# Patient Record
Sex: FEMALE | Race: WHITE | NOT HISPANIC OR LATINO | URBAN - METROPOLITAN AREA
[De-identification: names, ages, dates, MRNs, and addresses within clinical notes are randomized per-mention and may not be internally consistent; named-entity substitution may affect disease eponyms.]

---

## 2017-07-27 ENCOUNTER — EMERGENCY (EMERGENCY)
Facility: HOSPITAL | Age: 67
LOS: 1 days | Discharge: HOME | End: 2017-07-27
Attending: EMERGENCY MEDICINE | Admitting: EMERGENCY MEDICINE
Payer: MEDICARE

## 2017-07-27 VITALS
DIASTOLIC BLOOD PRESSURE: 91 MMHG | HEART RATE: 70 BPM | SYSTOLIC BLOOD PRESSURE: 165 MMHG | RESPIRATION RATE: 18 BRPM | OXYGEN SATURATION: 98 % | TEMPERATURE: 99 F

## 2017-07-27 VITALS
OXYGEN SATURATION: 98 % | HEART RATE: 98 BPM | WEIGHT: 149.91 LBS | HEIGHT: 63 IN | SYSTOLIC BLOOD PRESSURE: 163 MMHG | DIASTOLIC BLOOD PRESSURE: 97 MMHG | TEMPERATURE: 98 F | RESPIRATION RATE: 18 BRPM
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
## 2017-07-27 DIAGNOSIS — R51 HEADACHE: ICD-10-CM
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
## 2017-07-27 DIAGNOSIS — S09.90XA UNSPECIFIED INJURY OF HEAD, INITIAL ENCOUNTER: ICD-10-CM

## 2017-07-27 DIAGNOSIS — S05.11XA CONTUSION OF EYEBALL AND ORBITAL TISSUES, RIGHT EYE, INITIAL ENCOUNTER: ICD-10-CM

## 2017-07-27 PROCEDURE — 70450 CT HEAD/BRAIN W/O DYE: CPT | Mod: 26

## 2017-07-27 PROCEDURE — 99284 EMERGENCY DEPT VISIT MOD MDM: CPT

## 2017-07-27 PROCEDURE — 70450 CT HEAD/BRAIN W/O DYE: CPT

## 2017-07-27 PROCEDURE — 99284 EMERGENCY DEPT VISIT MOD MDM: CPT | Mod: 25

## 2017-07-27 NOTE — ED PROVIDER NOTE - MEDICAL DECISION MAKING DETAILS
Pt presents wih [facility] after fall a few days ago while drinking alcohol. CT head with NAD. Pt reassured. Given alcohol rehab info per her request and to f/up outpt.

## 2017-07-27 NOTE — ED PROVIDER NOTE - OBJECTIVE STATEMENT
65 yo female with hx of depression, "problem with alcohol" presents with fall 4 days ago after "I drank too much". Pt states she drank a lot of alcohol while out with family and fell and hit her right head against a counter and per family went unresponsive. Pt states that she was given "CPR" by her family and came back to consciousness and then was taken home and then went to sleep. Did not seek any medical help until today. Pt is c/o mild right sided 65 yo female with hx of depression, "problem with alcohol" presents with fall 4 days ago after "I drank too much". Pt states she drank a lot of alcohol while out with family and fell and hit her right head against a counter and per family went unresponsive. Pt states that she was given "CPR" by her family and came back to consciousness and then was taken home and then went to sleep. Did not seek any medical help until today. Pt comes in today because she noted some bruising around her right eye which developed a day after the fall which has worsened over the past 24 hours. Denies blurry or double vision or eye pain, focal neuro deficits.  Pt is c/o mild right sided headache which decreased with ibuprofen. No N/V/CP/SOB. No other complaints. No other injuries. Is not on prescribed anticoagulation or aspirin. 65 yo female with hx of depression, "problem with alcohol" presents with fall 4 days ago after "I drank too much". Pt states she drank a lot of alcohol while out with family and fell and hit her right head against a counter and per family went unresponsive. Pt states that she was given "CPR" by her family and came back to consciousness and then was taken home and then went to sleep. Did not seek any medical help until today. Pt comes in today because she noted some bruising around her right eye which developed a day after the fall which has worsened over the past 24 hours. Denies blurry or double vision or eye pain, focal neuro deficits.  Pt is c/o mild right sided headache which decreased with ibuprofen. No N/V/CP/SOB. No other complaints. No other injuries. Is not on prescribed anticoagulation or aspirin. Pt declines pain meds stating that she is not on in that much pain. 65 yo female with hx of depression, "problem with alcohol" presents with fall 4 days ago after "I drank too much". Pt states she drank a lot of alcohol while out with family and fell and hit her right head against a counter, and per family went unresponsive. Pt states that she was given "CPR" by her family and came back to consciousness and then was taken home and went to sleep. Did not seek any medical help until today. Pt comes in today because she noted some bruising around her right eye which developed a day after the fall which has worsened over the past 24 hours. Denies blurry or double vision or eye pain, focal neuro deficits.  Pt is c/o mild right sided headache which decreased with ibuprofen. No N/V/CP/SOB. No other complaints. No other injuries. Is not on prescribed anticoagulation or aspirin. Pt declines pain meds stating that she is not on in that much pain.

## 2017-07-27 NOTE — ED ADULT NURSE NOTE - OBJECTIVE STATEMENT
Pt presents to ER c/o one syncopal episode on Monday after drinking "a lot." Pt has bruising, swelling, and tenderness to right temporal region/face. Pt stated "My niece did CPR on me bc she said I wasn't breathing. I flew home the next day and woke up today with bruising so I wanted to get checked out." Pt denies CP, SOB, dizziness. Pt has normal sensation UE/LE/face, facial symmetry, hand grasp strong equal bilat, PERRL. Pt reports taking advil everyday for aches and pains.

## 2017-07-27 NOTE — ED ADULT TRIAGE NOTE - CHIEF COMPLAINT QUOTE
Patient c/o headache ,s/p fall last monday after had few alcohol drinks  . Today she noticed black eye on her rt eye , denies any eye pain nor blurry vision . But pt. stated that she passed out on the counter top and her niece had to do CPR .  Denies any nausea nor vomiting .

## 2019-02-10 ENCOUNTER — INPATIENT (INPATIENT)
Facility: HOSPITAL | Age: 69
LOS: 5 days | Discharge: NURSING, REHAB OR LONG-TERM CARE | DRG: 492 | End: 2019-02-16
Attending: ORTHOPAEDIC SURGERY | Admitting: ORTHOPAEDIC SURGERY
Payer: MEDICARE

## 2019-02-10 VITALS
RESPIRATION RATE: 16 BRPM | SYSTOLIC BLOOD PRESSURE: 119 MMHG | HEART RATE: 80 BPM | DIASTOLIC BLOOD PRESSURE: 71 MMHG | OXYGEN SATURATION: 100 % | TEMPERATURE: 99 F

## 2019-02-10 DIAGNOSIS — K92.2 GASTROINTESTINAL HEMORRHAGE, UNSPECIFIED: ICD-10-CM

## 2019-02-10 DIAGNOSIS — E78.5 HYPERLIPIDEMIA, UNSPECIFIED: ICD-10-CM

## 2019-02-10 DIAGNOSIS — Z96.649 PRESENCE OF UNSPECIFIED ARTIFICIAL HIP JOINT: Chronic | ICD-10-CM

## 2019-02-10 DIAGNOSIS — D64.9 ANEMIA, UNSPECIFIED: ICD-10-CM

## 2019-02-10 DIAGNOSIS — R63.8 OTHER SYMPTOMS AND SIGNS CONCERNING FOOD AND FLUID INTAKE: ICD-10-CM

## 2019-02-10 DIAGNOSIS — S82.891A OTHER FRACTURE OF RIGHT LOWER LEG, INITIAL ENCOUNTER FOR CLOSED FRACTURE: ICD-10-CM

## 2019-02-10 DIAGNOSIS — Z91.89 OTHER SPECIFIED PERSONAL RISK FACTORS, NOT ELSEWHERE CLASSIFIED: ICD-10-CM

## 2019-02-10 DIAGNOSIS — R55 SYNCOPE AND COLLAPSE: ICD-10-CM

## 2019-02-10 LAB
ALBUMIN SERPL ELPH-MCNC: 3.5 G/DL — SIGNIFICANT CHANGE UP (ref 3.3–5)
ALP SERPL-CCNC: 37 U/L — LOW (ref 40–120)
ALT FLD-CCNC: 14 U/L — SIGNIFICANT CHANGE UP (ref 10–45)
ANION GAP SERPL CALC-SCNC: 10 MMOL/L — SIGNIFICANT CHANGE UP (ref 5–17)
AST SERPL-CCNC: 12 U/L — SIGNIFICANT CHANGE UP (ref 10–40)
BASOPHILS # BLD AUTO: 0.03 K/UL — SIGNIFICANT CHANGE UP (ref 0–0.2)
BASOPHILS NFR BLD AUTO: 0.4 % — SIGNIFICANT CHANGE UP (ref 0–2)
BILIRUB SERPL-MCNC: 0.4 MG/DL — SIGNIFICANT CHANGE UP (ref 0.2–1.2)
BLD GP AB SCN SERPL QL: NEGATIVE — SIGNIFICANT CHANGE UP
BUN SERPL-MCNC: 47 MG/DL — HIGH (ref 7–23)
CALCIUM SERPL-MCNC: 8.6 MG/DL — SIGNIFICANT CHANGE UP (ref 8.4–10.5)
CHLORIDE SERPL-SCNC: 109 MMOL/L — HIGH (ref 96–108)
CO2 SERPL-SCNC: 20 MMOL/L — LOW (ref 22–31)
CREAT SERPL-MCNC: 0.67 MG/DL — SIGNIFICANT CHANGE UP (ref 0.5–1.3)
EOSINOPHIL # BLD AUTO: 0.02 K/UL — SIGNIFICANT CHANGE UP (ref 0–0.5)
EOSINOPHIL NFR BLD AUTO: 0.2 % — SIGNIFICANT CHANGE UP (ref 0–6)
GLUCOSE SERPL-MCNC: 147 MG/DL — HIGH (ref 70–99)
HCT VFR BLD CALC: 21.3 % — LOW (ref 34.5–45)
HCT VFR BLD CALC: 25.9 % — LOW (ref 34.5–45)
HGB BLD-MCNC: 7 G/DL — CRITICAL LOW (ref 11.5–15.5)
HGB BLD-MCNC: 8.2 G/DL — LOW (ref 11.5–15.5)
IMM GRANULOCYTES NFR BLD AUTO: 0.2 % — SIGNIFICANT CHANGE UP (ref 0–1.5)
LIDOCAIN IGE QN: 30 U/L — SIGNIFICANT CHANGE UP (ref 7–60)
LYMPHOCYTES # BLD AUTO: 1.48 K/UL — SIGNIFICANT CHANGE UP (ref 1–3.3)
LYMPHOCYTES # BLD AUTO: 18.3 % — SIGNIFICANT CHANGE UP (ref 13–44)
MAGNESIUM SERPL-MCNC: 1.5 MG/DL — LOW (ref 1.6–2.6)
MCHC RBC-ENTMCNC: 31.7 GM/DL — LOW (ref 32–36)
MCHC RBC-ENTMCNC: 32.4 PG — SIGNIFICANT CHANGE UP (ref 27–34)
MCHC RBC-ENTMCNC: 32.9 GM/DL — SIGNIFICANT CHANGE UP (ref 32–36)
MCHC RBC-ENTMCNC: 33.5 PG — SIGNIFICANT CHANGE UP (ref 27–34)
MCV RBC AUTO: 101.9 FL — HIGH (ref 80–100)
MCV RBC AUTO: 102.4 FL — HIGH (ref 80–100)
MONOCYTES # BLD AUTO: 0.64 K/UL — SIGNIFICANT CHANGE UP (ref 0–0.9)
MONOCYTES NFR BLD AUTO: 7.9 % — SIGNIFICANT CHANGE UP (ref 2–14)
NEUTROPHILS # BLD AUTO: 5.88 K/UL — SIGNIFICANT CHANGE UP (ref 1.8–7.4)
NEUTROPHILS NFR BLD AUTO: 73 % — SIGNIFICANT CHANGE UP (ref 43–77)
NRBC # BLD: 0 /100 WBCS — SIGNIFICANT CHANGE UP (ref 0–0)
OB PNL STL: POSITIVE
PLATELET # BLD AUTO: 117 K/UL — LOW (ref 150–400)
PLATELET # BLD AUTO: 133 K/UL — LOW (ref 150–400)
POTASSIUM SERPL-MCNC: 4.1 MMOL/L — SIGNIFICANT CHANGE UP (ref 3.5–5.3)
POTASSIUM SERPL-SCNC: 4.1 MMOL/L — SIGNIFICANT CHANGE UP (ref 3.5–5.3)
PROT SERPL-MCNC: 5.2 G/DL — LOW (ref 6–8.3)
RBC # BLD: 2.09 M/UL — LOW (ref 3.8–5.2)
RBC # BLD: 2.53 M/UL — LOW (ref 3.8–5.2)
RBC # FLD: 14.5 % — SIGNIFICANT CHANGE UP (ref 10.3–14.5)
RBC # FLD: 14.8 % — HIGH (ref 10.3–14.5)
RH IG SCN BLD-IMP: NEGATIVE — SIGNIFICANT CHANGE UP
RH IG SCN BLD-IMP: NEGATIVE — SIGNIFICANT CHANGE UP
SODIUM SERPL-SCNC: 139 MMOL/L — SIGNIFICANT CHANGE UP (ref 135–145)
TROPONIN T SERPL-MCNC: <0.01 NG/ML — SIGNIFICANT CHANGE UP (ref 0–0.01)
WBC # BLD: 8.07 K/UL — SIGNIFICANT CHANGE UP (ref 3.8–10.5)
WBC # BLD: 8.96 K/UL — SIGNIFICANT CHANGE UP (ref 3.8–10.5)
WBC # FLD AUTO: 8.07 K/UL — SIGNIFICANT CHANGE UP (ref 3.8–10.5)
WBC # FLD AUTO: 8.96 K/UL — SIGNIFICANT CHANGE UP (ref 3.8–10.5)

## 2019-02-10 PROCEDURE — 99223 1ST HOSP IP/OBS HIGH 75: CPT | Mod: GC

## 2019-02-10 PROCEDURE — 73560 X-RAY EXAM OF KNEE 1 OR 2: CPT | Mod: 26,RT

## 2019-02-10 PROCEDURE — 99285 EMERGENCY DEPT VISIT HI MDM: CPT | Mod: 25

## 2019-02-10 PROCEDURE — 71045 X-RAY EXAM CHEST 1 VIEW: CPT | Mod: 26

## 2019-02-10 PROCEDURE — 73610 X-RAY EXAM OF ANKLE: CPT | Mod: 26,77,RT

## 2019-02-10 PROCEDURE — 73700 CT LOWER EXTREMITY W/O DYE: CPT | Mod: 26,RT

## 2019-02-10 PROCEDURE — 73610 X-RAY EXAM OF ANKLE: CPT | Mod: 26,RT

## 2019-02-10 PROCEDURE — 93010 ELECTROCARDIOGRAM REPORT: CPT

## 2019-02-10 PROCEDURE — 73600 X-RAY EXAM OF ANKLE: CPT | Mod: 26,59,RT

## 2019-02-10 RX ORDER — ATORVASTATIN CALCIUM 80 MG/1
0 TABLET, FILM COATED ORAL
Qty: 0 | Refills: 0 | COMMUNITY

## 2019-02-10 RX ORDER — SERTRALINE 25 MG/1
1 TABLET, FILM COATED ORAL
Qty: 0 | Refills: 0 | COMMUNITY

## 2019-02-10 RX ORDER — SODIUM CHLORIDE 9 MG/ML
500 INJECTION INTRAMUSCULAR; INTRAVENOUS; SUBCUTANEOUS ONCE
Qty: 0 | Refills: 0 | Status: COMPLETED | OUTPATIENT
Start: 2019-02-10 | End: 2019-02-10

## 2019-02-10 RX ORDER — TRAMADOL HYDROCHLORIDE 50 MG/1
50 TABLET ORAL EVERY 4 HOURS
Qty: 0 | Refills: 0 | Status: DISCONTINUED | OUTPATIENT
Start: 2019-02-10 | End: 2019-02-13

## 2019-02-10 RX ORDER — SODIUM CHLORIDE 9 MG/ML
1000 INJECTION INTRAMUSCULAR; INTRAVENOUS; SUBCUTANEOUS
Qty: 0 | Refills: 0 | Status: DISCONTINUED | OUTPATIENT
Start: 2019-02-10 | End: 2019-02-10

## 2019-02-10 RX ORDER — ATORVASTATIN CALCIUM 80 MG/1
1 TABLET, FILM COATED ORAL
Qty: 0 | Refills: 0 | COMMUNITY

## 2019-02-10 RX ORDER — HYDROMORPHONE HYDROCHLORIDE 2 MG/ML
0.5 INJECTION INTRAMUSCULAR; INTRAVENOUS; SUBCUTANEOUS ONCE
Qty: 0 | Refills: 0 | Status: DISCONTINUED | OUTPATIENT
Start: 2019-02-10 | End: 2019-02-10

## 2019-02-10 RX ORDER — PANTOPRAZOLE SODIUM 20 MG/1
8 TABLET, DELAYED RELEASE ORAL
Qty: 80 | Refills: 0 | Status: DISCONTINUED | OUTPATIENT
Start: 2019-02-10 | End: 2019-02-10

## 2019-02-10 RX ORDER — SODIUM CHLORIDE 9 MG/ML
1000 INJECTION INTRAMUSCULAR; INTRAVENOUS; SUBCUTANEOUS ONCE
Qty: 0 | Refills: 0 | Status: COMPLETED | OUTPATIENT
Start: 2019-02-10 | End: 2019-02-10

## 2019-02-10 RX ORDER — PANTOPRAZOLE SODIUM 20 MG/1
80 TABLET, DELAYED RELEASE ORAL ONCE
Qty: 0 | Refills: 0 | Status: COMPLETED | OUTPATIENT
Start: 2019-02-10 | End: 2019-02-10

## 2019-02-10 RX ORDER — MAGNESIUM SULFATE 500 MG/ML
2 VIAL (ML) INJECTION ONCE
Qty: 0 | Refills: 0 | Status: COMPLETED | OUTPATIENT
Start: 2019-02-10 | End: 2019-02-10

## 2019-02-10 RX ORDER — INFLUENZA VIRUS VACCINE 15; 15; 15; 15 UG/.5ML; UG/.5ML; UG/.5ML; UG/.5ML
0.5 SUSPENSION INTRAMUSCULAR ONCE
Qty: 0 | Refills: 0 | Status: COMPLETED | OUTPATIENT
Start: 2019-02-10 | End: 2019-02-10

## 2019-02-10 RX ORDER — ACETAMINOPHEN 500 MG
650 TABLET ORAL EVERY 6 HOURS
Qty: 0 | Refills: 0 | Status: DISCONTINUED | OUTPATIENT
Start: 2019-02-10 | End: 2019-02-16

## 2019-02-10 RX ORDER — PANTOPRAZOLE SODIUM 20 MG/1
40 TABLET, DELAYED RELEASE ORAL EVERY 12 HOURS
Qty: 0 | Refills: 0 | Status: DISCONTINUED | OUTPATIENT
Start: 2019-02-10 | End: 2019-02-12

## 2019-02-10 RX ORDER — ATORVASTATIN CALCIUM 80 MG/1
20 TABLET, FILM COATED ORAL AT BEDTIME
Qty: 0 | Refills: 0 | Status: DISCONTINUED | OUTPATIENT
Start: 2019-02-10 | End: 2019-02-16

## 2019-02-10 RX ORDER — SERTRALINE 25 MG/1
0 TABLET, FILM COATED ORAL
Qty: 0 | Refills: 0 | COMMUNITY

## 2019-02-10 RX ADMIN — SODIUM CHLORIDE 1000 MILLILITER(S): 9 INJECTION INTRAMUSCULAR; INTRAVENOUS; SUBCUTANEOUS at 06:40

## 2019-02-10 RX ADMIN — SODIUM CHLORIDE 500 MILLILITER(S): 9 INJECTION INTRAMUSCULAR; INTRAVENOUS; SUBCUTANEOUS at 09:20

## 2019-02-10 RX ADMIN — TRAMADOL HYDROCHLORIDE 50 MILLIGRAM(S): 50 TABLET ORAL at 21:45

## 2019-02-10 RX ADMIN — HYDROMORPHONE HYDROCHLORIDE 0.5 MILLIGRAM(S): 2 INJECTION INTRAMUSCULAR; INTRAVENOUS; SUBCUTANEOUS at 16:13

## 2019-02-10 RX ADMIN — SODIUM CHLORIDE 1000 MILLILITER(S): 9 INJECTION INTRAMUSCULAR; INTRAVENOUS; SUBCUTANEOUS at 07:40

## 2019-02-10 RX ADMIN — HYDROMORPHONE HYDROCHLORIDE 0.5 MILLIGRAM(S): 2 INJECTION INTRAMUSCULAR; INTRAVENOUS; SUBCUTANEOUS at 11:54

## 2019-02-10 RX ADMIN — PANTOPRAZOLE SODIUM 40 MILLIGRAM(S): 20 TABLET, DELAYED RELEASE ORAL at 18:44

## 2019-02-10 RX ADMIN — TRAMADOL HYDROCHLORIDE 50 MILLIGRAM(S): 50 TABLET ORAL at 22:45

## 2019-02-10 RX ADMIN — HYDROMORPHONE HYDROCHLORIDE 0.5 MILLIGRAM(S): 2 INJECTION INTRAMUSCULAR; INTRAVENOUS; SUBCUTANEOUS at 09:46

## 2019-02-10 RX ADMIN — Medication 50 GRAM(S): at 11:28

## 2019-02-10 RX ADMIN — PANTOPRAZOLE SODIUM 10 MG/HR: 20 TABLET, DELAYED RELEASE ORAL at 08:50

## 2019-02-10 RX ADMIN — PANTOPRAZOLE SODIUM 80 MILLIGRAM(S): 20 TABLET, DELAYED RELEASE ORAL at 08:50

## 2019-02-10 RX ADMIN — HYDROMORPHONE HYDROCHLORIDE 0.5 MILLIGRAM(S): 2 INJECTION INTRAMUSCULAR; INTRAVENOUS; SUBCUTANEOUS at 09:22

## 2019-02-10 RX ADMIN — SODIUM CHLORIDE 125 MILLILITER(S): 9 INJECTION INTRAMUSCULAR; INTRAVENOUS; SUBCUTANEOUS at 11:00

## 2019-02-10 RX ADMIN — SODIUM CHLORIDE 1000 MILLILITER(S): 9 INJECTION INTRAMUSCULAR; INTRAVENOUS; SUBCUTANEOUS at 08:50

## 2019-02-10 RX ADMIN — ATORVASTATIN CALCIUM 20 MILLIGRAM(S): 80 TABLET, FILM COATED ORAL at 21:45

## 2019-02-10 NOTE — ED PROVIDER NOTE - OBJECTIVE STATEMENT
67 yo female h/o hld, depression/anxiety, s/p THR c/o syncope.  Pt reports recent dental surgery last week and was started on azithromycin as well as ibuprofen and tylenol afterwards (400 mg ibuprofen q4-6 hour and then 600 mg q 6 hour).  Pt noted onset of freq, black diarrhea and crampy abd pain as well as epigastric pain radiating to bilat uq since Fri, worse yest.  Pt reports fainting after using the bathroom last evening and again early this am.  Before fainting, pt felt v dizzy/lh, diaphoretic and weak w/ mild n.  Pt denies cp, palpitations but notes sob.  Dizziness/lh and sob worse w standing/walking.  No h/o pud, gi bleed.  No other recent abx prior to zpack for her teeth.  Pt c/o R ankle and mild knee pain after her syncope and unable to wt bear 2/2 pain.  No other pain in ext, neck/back pain.

## 2019-02-10 NOTE — CONSULT NOTE ADULT - ATTENDING COMMENTS
68 year old female with syncope and acute blood loss anemia from possible NSAIDS induced GI bleed. Vitals are stable. 1 unit PRBC given along with PPI. Plan for elective endoscopy as per GI. If H/H is stable then start patient on clear diet and advance as tolerated.

## 2019-02-10 NOTE — H&P ADULT - NSHPLABSRESULTS_GEN_ALL_CORE
LABS:              7.0    8.07  )-----------( 133      ( 10 Feb 2019 06:54 )             21.3     02-10  139  |  109<H>  |  47<H>  ----------------------------<  147<H>  4.1   |  20<L>  |  0.67    Ca    8.6      10 Feb 2019 06:54  Mg     1.5     02-10    TPro  5.2<L>  /  Alb  3.5  /  TBili  0.4  /  DBili  x   /  AST  12  /  ALT  14  /  AlkPhos  37<L>  02-10    PT/INR - ( 10 Feb 2019 06:54 )   PT: 10.2 sec;   INR: 0.91     PTT - ( 10 Feb 2019 06:54 )  PTT:22.7 sec    CARDIAC MARKERS ( 10 Feb 2019 06:54 )  x     / <0.01 ng/mL / 51 U/L / x     / 1.3 ng/mL    RADIOLOGY, EKG & ADDITIONAL TESTS: Reviewed.

## 2019-02-10 NOTE — PROGRESS NOTE ADULT - SUBJECTIVE AND OBJECTIVE BOX
68 year old woman with syncope, right foot pain & black stools.  Underwent root canal 02\06 after which was given azithromycin & took a few Percocet.   Noted pus & pain at sight, prompting a visit to another oral surgeon.  Began to take ibuprofen, 400 mg every 4 hours.  Had had diarrhea, but by 02\09 the stools became black.  Approximately 12 PM while out at elevator felt lightheaded & returned to her apartment.  Took it easy & wnt to bed, but 12 AM 02\10, went to bathroom & blacked out.  Unable to get up, she called for her  who wanted to take her to hospital, but she just went back to bed.  4 AM went to bathroom again, blacked out & they called me.  I directed them to ED.    Former smoker with coronary calcium prompting treatment with atorvastatin. Also takes sertraline for anxiety.    Had left total hip replacement at [facility] February 2016 for osteoarthritis    T< 100 Pulse 78 supine B\P 110\60 supine  Lungs clear  Card Nl S1&S2  Ext no edema      WBC 8070 Hgb 7.0 Plt 133K  BUN 44 Cr 0.67  PT/PTT Nl  Troponin negative    Upper GI bleed with syncope & right ankle fracture on Xray  Dr Anup Carvalho called to see her for GI & orthopedic service consulted  Will likely require transfusions & might require open reduction repair of ankle fracture  Will follow    Dr Diogo Quijano  [phone]

## 2019-02-10 NOTE — ED PROVIDER NOTE - CONSTITUTIONAL, MLM
normal... pale, well nourished, awake, alert, oriented to person, place, time/situation and in no apparent distress.

## 2019-02-10 NOTE — CONSULT NOTE ADULT - SUBJECTIVE AND OBJECTIVE BOX
INCOMPLETE:     [facility] SERVICE CONSULTATION NOTE    CC:    HPI:    ROS:  Otherwise negative, except as specified in HPI.    PMH:    PSH:    FH:    SH:    ALLERGIES:    MEDICATIONS:    VITAL SIGNS:  ICU Vital Signs Last 24 Hrs  T(C): 37.1 (10 Feb 2019 06:35), Max: 37.1 (10 Feb 2019 06:35)  T(F): 98.8 (10 Feb 2019 06:35), Max: 98.8 (10 Feb 2019 06:35)  HR: 80 (10 Feb 2019 06:35) (80 - 80)  BP: 119/71 (10 Feb 2019 06:35) (119/71 - 119/71)  BP(mean): --  ABP: --  ABP(mean): --  RR: 16 (10 Feb 2019 06:35) (16 - 16)  SpO2: 100% (10 Feb 2019 06:35) (100% - 100%)    CAPILLARY BLOOD GLUCOSE      POCT Blood Glucose.: 144 mg/dL (10 Feb 2019 06:42)      PHYSICAL EXAM:  Constitutional: resting comfortably in bed, NAD  HEENT: NC/AT; PERRL, anicteric sclera; no oropharyngeal erythema or exudates; MMM  Neck: supple, no appreciable JVD  Respiratory: CTA B/L, no W/R/R; respirations appear non-labored, conversive in full sentences  Cardiovascular: +S1/S2, RRR  Gastrointestinal: abdomen soft, NT/ND  Extremities: WWP; no clubbing, cyanosis or edema  Vascular: 2+ radial, femoral, and DP/PT pulses B/L  Dermatologic: skin normal color and turgor; no visible rashes  Neurological:     LABS:                        7.0    8.07  )-----------( 133      ( 10 Feb 2019 06:54 )             21.3     02-10    139  |  109<H>  |  47<H>  ----------------------------<  147<H>  4.1   |  20<L>  |  0.67    Ca    8.6      10 Feb 2019 06:54  Mg     1.5     02-10    TPro  5.2<L>  /  Alb  3.5  /  TBili  0.4  /  DBili  x   /  AST  12  /  ALT  14  /  AlkPhos  37<L>  02-10    PT/INR - ( 10 Feb 2019 06:54 )   PT: 10.2 sec;   INR: 0.91          PTT - ( 10 Feb 2019 06:54 )  PTT:22.7 sec    CARDIAC MARKERS ( 10 Feb 2019 06:54 )  x     / <0.01 ng/mL / 51 U/L / x     / 1.3 ng/mL            EKG: Reviewed.    RADIOLOGY & ADDITIONAL TESTS: Reviewed. [facility] SERVICE CONSULTATION NOTE    CC: acute blood loss anemia    HPI: 68F with PMHx of HLD, depression who presents after syncopal episode early in the morning. Per patient, she had root canal done on 2/6 during which she was found to have dental infection and given course of Azithromycin and instructions to take Advil 200mg x2 tabs Q6hrs along with Tylenol PRN. Patient reports taking Advil ATC and on 2/8, due to increased dental pain, took 3 tabs Q6hrs. Patient endorses developing dizziness and lightheadedness on 2/8 while out with friends. Also developed diarrhea at that time, however unclear whether melanotic. On 2/9, patient had near syncopal episode when trying to go to store, +lightheadedness and dizziness, however no LOC. Patient was able to return to apartment and get into bed. Reports continuing to have multiple loose bowel movements throughout the day, which had now become melanotic. On 2/10 around midnight, patient ambulated to bathroom and had syncopal episode, woke up on the floor of her bathroom with R ankle pain, was able to call  and was brought to ED. Otherwise, reports dyspnea and chest tightness, as well as epigastric pain with nausea. No emesis. No fevers/chills. No dysuria.     In ED, patient afebrile, HR 80, BPs 119-131/59-71, RR 16, SpO2 100% on RA. Labs notable for Hgb 7.0 (unclear baseline), elevated BUN. Fecal occult positive. Orthostatic negative. Patient started on PPI gtt and transfused 1U pRBC. GI consulted (Dr. Carvalho) by PMD (Dr. Quijano), with plans for EGD at later date if necessary. ICU consulted for telemetry placement.     ROS:  Otherwise negative, except as specified in HPI.    PMHx: HLD, depression    PSHx: L THR in 2016, R thumb surgery many years ago    FHx: noncontributory    SHx: Former tobacco user (quit x5-6yrs ago). Daily alcohol use, about 2-3 vodka drinks daily; no reported hx of withdrawal in the past.     ALLERGIES: PCN (hives)    MEDICATIONS: Atorvastatin QHS, Sertraline QD    VITAL SIGNS:  ICU Vital Signs Last 24 Hrs  T(C): 37.1 (10 Feb 2019 06:35), Max: 37.1 (10 Feb 2019 06:35)  T(F): 98.8 (10 Feb 2019 06:35), Max: 98.8 (10 Feb 2019 06:35)  HR: 80 (10 Feb 2019 06:35) (80 - 80)  BP: 119/71 (10 Feb 2019 06:35) (119/71 - 119/71)  RR: 16 (10 Feb 2019 06:35) (16 - 16)  SpO2: 100% (10 Feb 2019 06:35) (100% - 100%)    CAPILLARY BLOOD GLUCOSE  POCT Blood Glucose.: 144 mg/dL (10 Feb 2019 06:42)    PHYSICAL EXAM:  Constitutional: resting comfortably in bed, NAD  HEENT: NC/AT; PERRL, anicteric sclera; no oropharyngeal erythema or exudates; MMM  Neck: supple, no appreciable JVD  Respiratory: CTA B/L, no W/R/R; respirations appear non-labored, conversive in full sentences  Cardiovascular: +S1/S2, RRR  Gastrointestinal: abdomen soft, NT/ND  Extremities: WWP; no clubbing, cyanosis or edema  Vascular: 2+ radial, femoral, and DP/PT pulses B/L  Dermatologic: skin normal color and turgor; no visible rashes  Neurological:     LABS:                        7.0    8.07  )-----------( 133      ( 10 Feb 2019 06:54 )             21.3     02-10    139  |  109<H>  |  47<H>  ----------------------------<  147<H>  4.1   |  20<L>  |  0.67    Ca    8.6      10 Feb 2019 06:54  Mg     1.5     02-10    TPro  5.2<L>  /  Alb  3.5  /  TBili  0.4  /  DBili  x   /  AST  12  /  ALT  14  /  AlkPhos  37<L>  02-10    PT/INR - ( 10 Feb 2019 06:54 )   PT: 10.2 sec;   INR: 0.91          PTT - ( 10 Feb 2019 06:54 )  PTT:22.7 sec    CARDIAC MARKERS ( 10 Feb 2019 06:54 )  x     / <0.01 ng/mL / 51 U/L / x     / 1.3 ng/mL            EKG: Reviewed.    RADIOLOGY & ADDITIONAL TESTS: Reviewed. [facility] SERVICE CONSULTATION NOTE    CC: acute blood loss anemia    HPI: 68F with PMHx of HLD, depression who presents after syncopal episode early in the morning. Per patient, she had root canal done on 2/6 during which she was found to have dental infection and given course of Azithromycin and instructions to take Advil 200mg x2 tabs Q6hrs along with Tylenol PRN. Patient reports taking Advil ATC and on 2/8, due to increased dental pain, took 3 tabs Q6hrs. Patient endorses developing dizziness and lightheadedness on 2/8 while out with friends. Also developed diarrhea at that time, however unclear whether melanotic. On 2/9, patient had near syncopal episode when trying to go to store, +lightheadedness and dizziness, however no LOC. Patient was able to return to apartment and get into bed. Reports continuing to have multiple loose bowel movements throughout the day, which had now become melanotic. On 2/10 around midnight, patient ambulated to bathroom and had syncopal episode, woke up on the floor of her bathroom with R ankle pain, was able to call  and was brought to ED. Otherwise, reports dyspnea and chest tightness, as well as epigastric pain with nausea. No emesis. No fevers/chills. No dysuria.     In ED, patient afebrile, HR 80, BPs 119-131/59-71, RR 16, SpO2 100% on RA. Labs notable for Hgb 7.0 (unclear baseline), elevated BUN. Fecal occult positive. Orthostatic negative. Patient started on PPI gtt and transfused 1U pRBC. GI consulted (Dr. Carvalho) by PMD (Dr. Quijano), with plans for EGD at later date if necessary. ICU consulted for telemetry placement.     ROS:  Otherwise negative, except as specified in HPI.    PMHx: HLD, depression    PSHx: L THR in 2016, R thumb surgery many years ago    FHx: noncontributory    SHx: Former tobacco user (quit x5-6yrs ago). Daily alcohol use, about 2-3 vodka drinks daily; no reported hx of withdrawal in the past.     ALLERGIES: PCN (hives)    MEDICATIONS: Atorvastatin QHS, Sertraline QD    VITAL SIGNS:  ICU Vital Signs Last 24 Hrs  T(C): 37.1 (10 Feb 2019 06:35), Max: 37.1 (10 Feb 2019 06:35)  T(F): 98.8 (10 Feb 2019 06:35), Max: 98.8 (10 Feb 2019 06:35)  HR: 80 (10 Feb 2019 06:35) (80 - 80)  BP: 119/71 (10 Feb 2019 06:35) (119/71 - 119/71)  RR: 16 (10 Feb 2019 06:35) (16 - 16)  SpO2: 100% (10 Feb 2019 06:35) (100% - 100%)    CAPILLARY BLOOD GLUCOSE  POCT Blood Glucose.: 144 mg/dL (10 Feb 2019 06:42)    PHYSICAL EXAM:  Constitutional: resting comfortably in bed, NAD  HEENT: NCAT, PERRL, EOMI, conjunctival pallor, MMM   Neck: supple, no appreciable JVD  Respiratory: clear to auscultation bilaterally, no wheezing/rales/rhonchi, respirations appear non-labored, conversive in full sentences  Cardiovascular: +S1/S2, RRR, no murmurs  Gastrointestinal: mild epigastric tenderness to deep palpation, bowel sounds throughout, nondistended  Extremities: RLE in cast, able to wiggle toes. LLE without edema, +DP pulses   Neurological: AAOx3, moves all extremities, CNII-XII grossly intact    LABS:                        7.0    8.07  )-----------( 133      ( 10 Feb 2019 06:54 )             21.3     139  |  109<H>  |  47<H>  ----------------------------<  147<H>  4.1   |  20<L>  |  0.67    Ca    8.6      10 Feb 2019 06:54  Mg     1.5     02-10    TPro  5.2<L>  /  Alb  3.5  /  TBili  0.4  /  DBili  x   /  AST  12  /  ALT  14  /  AlkPhos  37<L>  02-10    PT/INR - ( 10 Feb 2019 06:54 )   PT: 10.2 sec;   INR: 0.91     PTT - ( 10 Feb 2019 06:54 )  PTT:22.7 sec    CARDIAC MARKERS ( 10 Feb 2019 06:54 )  x     / <0.01 ng/mL / 51 U/L / x     / 1.3 ng/mL

## 2019-02-10 NOTE — H&P ADULT - NSHPPHYSICALEXAM_GEN_ALL_CORE
VITAL SIGNS:  T(C): 37 (02-10-19 @ 13:53), Max: 37.1 (02-10-19 @ 06:35)  T(F): 98.6 (02-10-19 @ 13:53), Max: 98.8 (02-10-19 @ 06:35)  HR: 85 (02-10-19 @ 12:37) (76 - 85)  BP: 120/59 (02-10-19 @ 12:37) (110/60 - 128/59)  RR: 17 (02-10-19 @ 12:37) (16 - 17)  SpO2: 100% (02-10-19 @ 12:37) (100% - 100%)    PHYSICAL EXAM:  Constitutional: WDWN female resting comfortably in bed; NAD  HEENT: NC/AT, PERRL, EOMI, anicteric sclera, MMM, mild conjunctival pallor  Neck: supple; no JVD or thyromegaly  Resp: CTA B/L; no W/R/R, no retractions  CV: +S1/S2; RRR; no M/R/G; PMI non-displaced  GI: soft, NT/ND; no rebound or guarding; +BSx4  Extremities: WWP, no clubbing or cyanosis; no peripheral edema  Msk: NROM x4; R ankle splinted in ace bandage  Vascular: 2+ radial, femoral, DP/PT pulses B/L  Dermatologic: skin warm, dry and intact; no rashes, wounds, or scars  Lymphatic: no submandibular or cervical LAD  Neurologic: AAOx3; CNII-XII grossly intact; no focal deficits  Psychiatric: affect and characteristics of appearance, verbalizations, behaviors are appropriate VITAL SIGNS:  T(C): 37 (02-10-19 @ 13:53), Max: 37.1 (02-10-19 @ 06:35)  T(F): 98.6 (02-10-19 @ 13:53), Max: 98.8 (02-10-19 @ 06:35)  HR: 85 (02-10-19 @ 12:37) (76 - 85)  BP: 120/59 (02-10-19 @ 12:37) (110/60 - 128/59)  RR: 17 (02-10-19 @ 12:37) (16 - 17)  SpO2: 100% (02-10-19 @ 12:37) (100% - 100%)    PHYSICAL EXAM:  Constitutional: WDWN female resting comfortably in bed; NAD  HEENT: NC/AT, PERRL, EOMI, anicteric sclera, MMM, mild conjunctival pallor  Neck: supple; no JVD or thyromegaly  Resp: CTA B/L; no W/R/R, no retractions  CV: +S1/S2; RRR; no M/R/G; PMI non-displaced  GI: soft, NT/ND; no rebound or guarding; +BSx4  Extremities: WWP, no clubbing or cyanosis; no peripheral edema  Msk: NROM x4; R ankle splinted in ace bandage  Vascular: 2+ radial, femoral, DP/PT pulses B/L  Dermatologic: skin warm, dry and intact; no rashes, wounds, or scars  Lymphatic: no submandibular or cervical LAD  Neurologic: AAOx3; CNII-XII grossly intact; no focal deficits

## 2019-02-10 NOTE — H&P ADULT - PROBLEM SELECTOR PLAN 7
1) PCP Contacted on Admission: (Y/N) --> Yes Name & Phone #: Dr. Quijano, following in house  2) Date of Contact with PCP:  3) PCP Contacted at Discharge: (Y/N)  4) Summary of Handoff Given to PCP:   5) Post-Discharge Appointment Date and Location:

## 2019-02-10 NOTE — ED PROVIDER NOTE - NEUROLOGICAL, MLM
awake, alert, oriented x 3, CN II-XII grossly intact, motor 5/5, no gross sens deficits,  speech clear.

## 2019-02-10 NOTE — H&P ADULT - PROBLEM SELECTOR PLAN 6
F: none  E: replete PRN, Mg > 2, K > 4  N: clears, NPO @ [facility] for EGD  DVT PPx: holding in setting of GIB  FULL CODE  Dispo: admit to [facility]

## 2019-02-10 NOTE — ED PROVIDER NOTE - MUSCULOSKELETAL, MLM
Spine appears normal, neck/back nontender, RLE- decreased rom R ankle 2/2 pain, + ecchymosis and ttp bilat malleoli and ant ankle, foot nontender, dp 1+, knee w mild ttp over ecchyomis medial patella w/o jt line ttp, mild ttp prox fibula area, all other ext nontender w/o deformity

## 2019-02-10 NOTE — ED PROVIDER NOTE - PROGRESS NOTE DETAILS
ICU consulted and will eval.  PMD, Dr Quijano, in ed and pt discussed.  He has no preference for ortho consult and requests Dr Carvalho if pt's gi does not come to [facility] for procedures.   Pt's gi, Dr Tate, paged.  Dr Quijano contacted Dr Carvalho while he was in ED - Dr Carvalho will follow. Ortho consulted and will eval.  Per Dr Quijano, if pt declined by ICU, pt tba to him. Ortho eval pt and request ct knee to eval for poss small fx.  CT ordered.  Await icu eval. Pt accepted to [facility].

## 2019-02-10 NOTE — ED ADULT NURSE NOTE - OBJECTIVE STATEMENT
pt. BIBA, s/p syncope x 2, with resultant rt ankle pain following last fall, reports diarrhea over last 24 hrs., following resumption of ABX, due to dental issue, no focal neuro deficits appreciated, rt ankle exam reveals edema/tenderness to medial aspect, cms in-tact, PE otherwise unremarkable of trauma, denies complaint otherwise, presents with NS 1 L bolus in place per EMS

## 2019-02-10 NOTE — CONSULT NOTE ADULT - ASSESSMENT
Impression: acute UGI bleed NSAID + SSRI.  acute blood loss anemia  Continue PPI, monitor H/H  elective EGD unless situation changes
68F with PMHx of HLD, depression who presents after syncopal episode early in the morning, found to have acute blood anemia likely 2/2 UGIB. ICU consulted for telemetry monitoring.     #Acute blood loss anemia: Likely 2/2 UGIB/bleeding ulcer in the setting of NSAID use. Fecal occult positive. Orthostatic negative upon arrival to ED, however symptomatic with syncopal episode. Transfused 1U pRBC and started on PPI gtt in the ED.   - GI (Dr. Carvalho) consulted, appreciate recommendations   - would continue PPI IV BID   - f/u post transfusion CBC   - maintain active Type and Screen and would transfuse to Hgb goal >7  - would keep NPO for now  - obtain two large bore IVs     Will admit to [facility] for telemetry monitoring given syncope.     Discussed above with Dr. Lee and primary team.

## 2019-02-10 NOTE — ED PROVIDER NOTE - CARE PLAN
Principal Discharge DX:	UGI bleed  Secondary Diagnosis:	Syncope  Secondary Diagnosis:	Ankle fracture

## 2019-02-10 NOTE — PATIENT PROFILE ADULT - BRADEN SCORE
Jermaine Reyes called requesting a refill of the below medication which has been pended for you:     Requested Prescriptions     Pending Prescriptions Disp Refills    vitamin D (ERGOCALCIFEROL) 34127 units CAPS capsule 12 capsule 3     Sig: Take 1 capsule by mouth once a week       Last Appointment Date: Visit date not found  Next Appointment Date: Visit date not found    No Known Allergies
15

## 2019-02-10 NOTE — ED ADULT NURSE REASSESSMENT NOTE - NS ED NURSE REASSESS COMMENT FT1
assessment and intervention as noted, spouse at bedside, awaiting XR, pt. and spouse utd on poc, with understanding verbalized, assessment on-going

## 2019-02-10 NOTE — ED PROVIDER NOTE - MEDICAL DECISION MAKING DETAILS
Pt c/o syncope, ankle pain s/p fall/syncope.  Pt also w recent heavy nsaid use and reports black diarrhea and epigastric pain.  Hgb 7.  I suspect pt w ugi bleed 2/2 nsaid w syncope related to dehydration and symptomatic anemia.  Pt not orthostatic.  Trop neg, ekg w/o stemi.  Pt w ankle ttp and fx on xray.  Plan prbc, protonix bolus/gtt, icu/gi/ortho consults; admit.

## 2019-02-10 NOTE — ED PROVIDER NOTE - DIAGNOSTIC INTERPRETATION
ER Physician:  Emilia Director  CHEST XRAY INTERPRETATION: lungs clear, heart shadow normal, bony structures intact     ER Physician: Emilia Director  INTERPRETATION: knee - no acute fracture; no soft tissue swelling noted; normal bony alignment.   ER Physician: Emilia Director  INTERPRETATION:  ankle - + distal fibula fracture; widened mortise, + soft tissue swelling noted; normal bony alignment.

## 2019-02-10 NOTE — CONSULT NOTE ADULT - SUBJECTIVE AND OBJECTIVE BOX
Patient is a 68y old  Female who presents with a chief complaint of syncope    HPI: 68 years old female with history of hypercholesterolemia on atorvastatin, also on sertraline recently had dental abscess and dental work treated with zpak and taking advil + tylenol and was taking 2 advil each time and upped to 3 each time and on Friday noticed herself going to bathroom frequently decided to use imodium on Saturday because of frequency but still had dark stool and at 12 midnight fainted and had another syncopal episode at 4 am and called Dr. Quijano who advised her to come to ED and Hgb was 7 and also had Left ankle fracture      REVIEW OF SYSTEMS:  Constitutional: No fever,   ENMT:  No difficulty hearing, tinnitus, lightheaded  Respiratory: No cough, wheezing, chills or hemoptysis  Cardiovascular: + chest discomfort initially no, palpitations, dizziness or leg swelling  Gastrointestinal: No abdominal or epigastric pain. No nausea, vomiting or hematemesis; + diarrhea + melena none since in ED  Skin: No itching, burning, rashes or lesions   Musculoskeletal: No joint pain or swelling; No muscle, back or extremity pain    PAST MEDICAL & SURGICAL HISTORY:  Anxiety  Depression  HLD (hyperlipidemia)  History of hip replacement, total      FAMILY HISTORY:      SOCIAL HISTORY:  Smoking Status: [ ] Current, [ ] Former, [ ] Never  Pack Years:    outside meds:  atorvastatin  sertraline    MEDICATIONS:  MEDICATIONS  (STANDING):  pantoprazole  Injectable 40 milliGRAM(s) IV Push every 12 hours  sodium chloride 0.9%. 1000 milliLiter(s) (125 mL/Hr) IV Continuous <Continuous>    MEDICATIONS  (PRN):      Allergies    penicillin (Rash)    Intolerances        Vital Signs Last 24 Hrs  T(C): 36.7 (10 Feb 2019 11:00), Max: 37.1 (10 Feb 2019 06:35)  T(F): 98 (10 Feb 2019 11:00), Max: 98.8 (10 Feb 2019 06:35)  HR: 77 (10 Feb 2019 11:00) (76 - 85)  BP: 128/59 (10 Feb 2019 11:00) (110/60 - 128/59)  BP(mean): --  RR: 16 (10 Feb 2019 11:00) (16 - 16)  SpO2: 100% (10 Feb 2019 11:00) (100% - 100%)        PHYSICAL EXAM:    General: Well developed; well nourished; in no acute distress  HEENT: MMM, conjunctiva and sclera clear pale lips  Gastrointestinal: Soft, non-tender non-distended; Normal bowel sounds; No rebound or guarding  Extremities: Normal range of motion, No clubbing, cyanosis or edema  Neurological: Alert and oriented x3  Skin: Warm and dry. No obvious rash      LABS:                        7.0    8.07  )-----------( 133      ( 10 Feb 2019 06:54 )             21.3     02-10    139  |  109<H>  |  47<H>  ----------------------------<  147<H>  4.1   |  20<L>  |  0.67    Ca    8.6      10 Feb 2019 06:54  Mg     1.5     02-10    TPro  5.2<L>  /  Alb  3.5  /  TBili  0.4  /  DBili  x   /  AST  12  /  ALT  14  /  AlkPhos  37<L>  02-10          RADIOLOGY & ADDITIONAL STUDIES:

## 2019-02-10 NOTE — H&P ADULT - PROBLEM SELECTOR PLAN 1
Pt presenting after 2 syncopal episodes in setting of diarrhea with dark/black stools after significant NSAID use s/p dental procedure. Pt presenting after 2 syncopal episodes in setting of diarrhea with dark/black stools after significant NSAID use s/p dental procedure.  -s/p 1U PRBCs with Hgb 8.2

## 2019-02-10 NOTE — H&P ADULT - ASSESSMENT
67yo F with PMH of HLD presenting after syncopal anemia, found to have symptomatic anemia requiring transfusion after GIB in setting of NSAID use and diarrhea. Pt also with fracture of ankle, splinted, pending ORIF.

## 2019-02-10 NOTE — H&P ADULT - HISTORY OF PRESENT ILLNESS
69yo F with PMH of HLD and depression, presenting after syncopal episodes x 2. Pt had a root canal done Wednesday 2/6 with subsequent infection requiring washout, had been on PO Azithromycin as well as Ibuprofen (400mg-600mg Q4-6) and Tylenol. Since Thursday night/Friday AM, the patient has experienced loose diarrhea with dark, nearly black stools (~8-10 episodes on Friday, >20 episodes yesterday) and cramping abd pain which is now resolved. Pt has felt lightheaded for the past 2 days and had near syncopal episode yesterday afternoon with associated diaphoresis. Pt had full syncopal episode last night when getting up to go to the bathroom, fell and sustained traumatic injury to her R ankle (unable to bear weight), followed by a second syncopal episode later in the night prompting presentation to the ED. She endorses SOB on exertion since yesterday. Pt denies CP, palpitations, subjective fever/chills, N/V, vision changes, neck or head pain.    In the ED, vitals: T 98, HR 80, /71, RR 16, SpO2 100%  Pt had Xray of ankle showing fracture.  Pt received 1U PRBCs, 1.5L NS, Protonix VI 80 x 1. Evaluated by orthopedics who splinted her ankle (got Dilaudid 0.5 x 2). Evaluated by GI in the ED, recommending post-transfusion CBC and possible elective endoscopy pending results.

## 2019-02-10 NOTE — ED ADULT NURSE REASSESSMENT NOTE - NS ED NURSE REASSESS COMMENT FT1
pt. back from Radiology, additional warm blankets provided for comfort, spouse remains at bedside, assessment on-going

## 2019-02-10 NOTE — CONSULT NOTE ADULT - SUBJECTIVE AND OBJECTIVE BOX
Orthopaedic Consult Note    Consult Requested by: ER  Surgeon: Lavonne    CC:Patient is a 68y old  Female who presents with a chief complaint of L ankle fracture    HPI  68yFemale  c/o L ankle pain after unwitnessed fall last night. Has been unable to bear weight. Xr showing SER4 equivalent fracture and medial tibial plateau fracture. Anemic, being admitted for possible GI bleed to medical service.    PAST MEDICAL & SURGICAL HISTORY:  Anxiety  Depression  HLD (hyperlipidemia)  History of hip replacement, total    Allergies    No Known Allergies    Intolerances      MEDICATIONS  (STANDING):  HYDROmorphone  Injectable 0.5 milliGRAM(s) IV Push Once  pantoprazole Infusion 8 mG/Hr (10 mL/Hr) IV Continuous <Continuous>  sodium chloride 0.9%. 1000 milliLiter(s) (125 mL/Hr) IV Continuous <Continuous>      Vital Signs Last 24 Hrs  T(C): 37.1 (10 Feb 2019 06:35), Max: 37.1 (10 Feb 2019 06:35)  T(F): 98.8 (10 Feb 2019 06:35), Max: 98.8 (10 Feb 2019 06:35)  HR: 80 (10 Feb 2019 06:35) (80 - 80)  BP: 119/71 (10 Feb 2019 06:35) (119/71 - 119/71)  BP(mean): --  RR: 16 (10 Feb 2019 06:35) (16 - 16)  SpO2: 100% (10 Feb 2019 06:35) (100% - 100%)    Physical Exam:  General: NAD, alert & oriented x 3  RLE  moderately swollen, ecchymotic over medial/lateral malleoli   Pain with attempted ankle ROM  Mild jointline tenderness of knee to palpation, pain with flexion of knee joint  Motor: 5/5 GS/TA/EHL/FHL    Sensation: SILT s/sp/dp/tib  Pulses:  DP/PT 2+ , toes/foot WWP                                7.0    8.07  )-----------( 133      ( 10 Feb 2019 06:54 )             21.3     02-10    139  |  109<H>  |  47<H>  ----------------------------<  147<H>  4.1   |  20<L>  |  0.67    Ca    8.6      10 Feb 2019 06:54  Mg     1.5     02-10    TPro  5.2<L>  /  Alb  3.5  /  TBili  0.4  /  DBili  x   /  AST  12  /  ALT  14  /  AlkPhos  37<L>  02-10      Imaging: XR showing SER4 equivalent    A/P: 68yFemale w/ R ankle SER4 equivalent, nondisplaced medial tibia plateau fracture   - will obtain stress views of ankle to determine stability of ankle  - NWB RLE  - NOTE INCOMPLETE  Discussed with chief/attending  Ortho Pager: [phone] Orthopaedic Consult Note    Consult Requested by: ER  Surgeon: Lavonne    CC:Patient is a 68y old  Female who presents with a chief complaint of L ankle fracture    HPI  68yFemale  c/o L ankle pain after unwitnessed fall last night. Has been unable to bear weight. Xr showing SER4 equivalent fracture and medial tibial plateau fracture. Anemic, being admitted for possible GI bleed to medical service.    PAST MEDICAL & SURGICAL HISTORY:  Anxiety  Depression  HLD (hyperlipidemia)  History of hip replacement, total    Allergies    No Known Allergies    Intolerances      MEDICATIONS  (STANDING):  HYDROmorphone  Injectable 0.5 milliGRAM(s) IV Push Once  pantoprazole Infusion 8 mG/Hr (10 mL/Hr) IV Continuous <Continuous>  sodium chloride 0.9%. 1000 milliLiter(s) (125 mL/Hr) IV Continuous <Continuous>      Vital Signs Last 24 Hrs  T(C): 37.1 (10 Feb 2019 06:35), Max: 37.1 (10 Feb 2019 06:35)  T(F): 98.8 (10 Feb 2019 06:35), Max: 98.8 (10 Feb 2019 06:35)  HR: 80 (10 Feb 2019 06:35) (80 - 80)  BP: 119/71 (10 Feb 2019 06:35) (119/71 - 119/71)  BP(mean): --  RR: 16 (10 Feb 2019 06:35) (16 - 16)  SpO2: 100% (10 Feb 2019 06:35) (100% - 100%)    Physical Exam:  General: NAD, alert & oriented x 3  RLE  moderately swollen, ecchymotic over medial/lateral malleoli   Pain with attempted ankle ROM  Mild jointline tenderness of knee to palpation, pain with flexion of knee joint  Motor: 5/5 GS/TA/EHL/FHL    Sensation: SILT s/sp/dp/tib  Pulses:  DP/PT 2+ , toes/foot WWP                                7.0    8.07  )-----------( 133      ( 10 Feb 2019 06:54 )             21.3     02-10    139  |  109<H>  |  47<H>  ----------------------------<  147<H>  4.1   |  20<L>  |  0.67    Ca    8.6      10 Feb 2019 06:54  Mg     1.5     02-10    TPro  5.2<L>  /  Alb  3.5  /  TBili  0.4  /  DBili  x   /  AST  12  /  ALT  14  /  AlkPhos  37<L>  02-10      Imaging: XR showing SER4 equivalent    A/P: 68yFemale w/ R ankle SER4 equivalent, nondisplaced medial tibia plateau fracture   - stress views obtained showing talar shift and widening of medial clear space   - NWB RLE  - CT for eval of tibia  - NOTE INCOMPLETE  Discussed with chief/attending  Ortho Pager: [phone] Orthopaedic Consult Note    Consult Requested by: ER  Surgeon: Lavonne    CC:Patient is a 68y old  Female who presents with a chief complaint of L ankle fracture    HPI  68yFemale  c/o L ankle pain after unwitnessed fall last night. Has been unable to bear weight. Xr showing SER4 equivalent fracture and medial tibial plateau fracture. Anemic, being admitted for possible GI bleed to medical service.    PAST MEDICAL & SURGICAL HISTORY:  Anxiety  Depression  HLD (hyperlipidemia)  History of hip replacement, total    Allergies    No Known Allergies    Intolerances      MEDICATIONS  (STANDING):  HYDROmorphone  Injectable 0.5 milliGRAM(s) IV Push Once  pantoprazole Infusion 8 mG/Hr (10 mL/Hr) IV Continuous <Continuous>  sodium chloride 0.9%. 1000 milliLiter(s) (125 mL/Hr) IV Continuous <Continuous>      Vital Signs Last 24 Hrs  T(C): 37.1 (10 Feb 2019 06:35), Max: 37.1 (10 Feb 2019 06:35)  T(F): 98.8 (10 Feb 2019 06:35), Max: 98.8 (10 Feb 2019 06:35)  HR: 80 (10 Feb 2019 06:35) (80 - 80)  BP: 119/71 (10 Feb 2019 06:35) (119/71 - 119/71)  BP(mean): --  RR: 16 (10 Feb 2019 06:35) (16 - 16)  SpO2: 100% (10 Feb 2019 06:35) (100% - 100%)    Physical Exam:  General: NAD, alert & oriented x 3  RLE  moderately swollen, ecchymotic over medial/lateral malleoli   Pain with attempted ankle ROM  Mild jointline tenderness of knee to palpation, pain with flexion of knee joint  Motor: 5/5 GS/TA/EHL/FHL    Sensation: SILT s/sp/dp/tib  Pulses:  DP/PT 2+ , toes/foot WWP                                7.0    8.07  )-----------( 133      ( 10 Feb 2019 06:54 )             21.3     02-10    139  |  109<H>  |  47<H>  ----------------------------<  147<H>  4.1   |  20<L>  |  0.67    Ca    8.6      10 Feb 2019 06:54  Mg     1.5     02-10    TPro  5.2<L>  /  Alb  3.5  /  TBili  0.4  /  DBili  x   /  AST  12  /  ALT  14  /  AlkPhos  37<L>  02-10      Imaging: XR showing SER4 equivalent    A/P: 68yFemale w/ R ankle SER4 equivalent fracture  - stress views obtained showing talar shift and widening of medial clear space   - NWB RLE in splint, please keep elevated on 2-3 pillows   - CT for eval of tibia showing no fracture of tibia plateau  - injury will need surgery, once medically clearable  - ortho to follow, will plan for ORIF once cleared  Discussed with chief/attending  Ortho Pager: [phone]

## 2019-02-10 NOTE — H&P ADULT - PROBLEM SELECTOR PLAN 3
Pt with dark stools and anemia requiring 1U PRBCs.  -plan for EGD with Dr. Carvalho tomorrow  -NPO @ [facility] for EGD Pt with dark stools and anemia requiring 1U PRBCs.  -protonix 40 Q12  -plan for EGD with Dr. Carvalho tomorrow  -NPO @ [facility] for EGD

## 2019-02-11 LAB
HCT VFR BLD CALC: 22.8 % — LOW (ref 34.5–45)
HGB BLD-MCNC: 7.5 G/DL — LOW (ref 11.5–15.5)
MCHC RBC-ENTMCNC: 32.9 GM/DL — SIGNIFICANT CHANGE UP (ref 32–36)
MCHC RBC-ENTMCNC: 33.5 PG — SIGNIFICANT CHANGE UP (ref 27–34)
MCV RBC AUTO: 101.8 FL — HIGH (ref 80–100)
NRBC # BLD: 0 /100 WBCS — SIGNIFICANT CHANGE UP (ref 0–0)
PLATELET # BLD AUTO: 118 K/UL — LOW (ref 150–400)
RBC # BLD: 2.24 M/UL — LOW (ref 3.8–5.2)
RBC # FLD: 14.6 % — HIGH (ref 10.3–14.5)
WBC # BLD: 7.28 K/UL — SIGNIFICANT CHANGE UP (ref 3.8–10.5)
WBC # FLD AUTO: 7.28 K/UL — SIGNIFICANT CHANGE UP (ref 3.8–10.5)

## 2019-02-11 PROCEDURE — 99232 SBSQ HOSP IP/OBS MODERATE 35: CPT | Mod: GC

## 2019-02-11 RX ORDER — MORPHINE SULFATE 50 MG/1
1 CAPSULE, EXTENDED RELEASE ORAL ONCE
Qty: 0 | Refills: 0 | Status: DISCONTINUED | OUTPATIENT
Start: 2019-02-11 | End: 2019-02-11

## 2019-02-11 RX ADMIN — PANTOPRAZOLE SODIUM 40 MILLIGRAM(S): 20 TABLET, DELAYED RELEASE ORAL at 17:30

## 2019-02-11 RX ADMIN — PANTOPRAZOLE SODIUM 40 MILLIGRAM(S): 20 TABLET, DELAYED RELEASE ORAL at 06:06

## 2019-02-11 RX ADMIN — TRAMADOL HYDROCHLORIDE 50 MILLIGRAM(S): 50 TABLET ORAL at 21:58

## 2019-02-11 RX ADMIN — MORPHINE SULFATE 1 MILLIGRAM(S): 50 CAPSULE, EXTENDED RELEASE ORAL at 13:25

## 2019-02-11 RX ADMIN — MORPHINE SULFATE 1 MILLIGRAM(S): 50 CAPSULE, EXTENDED RELEASE ORAL at 17:29

## 2019-02-11 RX ADMIN — TRAMADOL HYDROCHLORIDE 50 MILLIGRAM(S): 50 TABLET ORAL at 22:28

## 2019-02-11 RX ADMIN — ATORVASTATIN CALCIUM 20 MILLIGRAM(S): 80 TABLET, FILM COATED ORAL at 21:58

## 2019-02-11 NOTE — PROGRESS NOTE ADULT - ASSESSMENT
67yo F with PMH of HLD presenting after syncopal anemia, found to have symptomatic anemia requiring transfusion after GIB in setting of NSAID use and diarrhea. Pt also with fracture of ankle, splinted, pending ORIF after evaluation of GI bleed.

## 2019-02-11 NOTE — PROGRESS NOTE ADULT - SUBJECTIVE AND OBJECTIVE BOX
Pt seen and examined   stable overnight  for EGD today    REVIEW OF SYSTEMS:  Constitutional: No fever, weight loss or fatigue  Cardiovascular: No chest pain, palpitations, dizziness or leg swelling  Gastrointestinal: No abdominal or epigastric pain. No nausea, vomiting or hematemesis;   Skin: No itching, burning, rashes or lesions       MEDICATIONS:  MEDICATIONS  (STANDING):  atorvastatin 20 milliGRAM(s) Oral at bedtime  pantoprazole  Injectable 40 milliGRAM(s) IV Push every 12 hours    MEDICATIONS  (PRN):  acetaminophen   Tablet .. 650 milliGRAM(s) Oral every 6 hours PRN Mild Pain (1 - 3)  traMADol 50 milliGRAM(s) Oral every 4 hours PRN Moderate Pain (4 - 6)      Allergies    penicillin (Rash)    Intolerances        Vital Signs Last 24 Hrs  T(C): 36.8 (11 Feb 2019 13:00), Max: 37 (10 Feb 2019 22:06)  T(F): 98.2 (11 Feb 2019 13:00), Max: 98.6 (10 Feb 2019 22:06)  HR: 70 (11 Feb 2019 12:57) (70 - 76)  BP: 139/63 (11 Feb 2019 12:57) (100/52 - 139/63)  BP(mean): 90 (11 Feb 2019 12:57) (71 - 90)  RR: 14 (11 Feb 2019 12:57) (14 - 18)  SpO2: 100% (11 Feb 2019 12:57) (95% - 100%)    02-11 @ 07:01  -  02-11 @ 14:57  --------------------------------------------------------  IN: 0 mL / OUT: 300 mL / NET: -300 mL        PHYSICAL EXAM:    General: Well developed; well nourished; in no acute distress  HEENT: MMM, conjunctiva and sclera clear  Gastrointestinal: Soft non-tender non-distended; Normal bowel sounds; No hepatosplenomegaly  Skin: Warm and dry. No obvious rash    LABS:      CBC Full  -  ( 10 Feb 2019 18:18 )  WBC Count : 8.96 K/uL  Hemoglobin : 8.2 g/dL  Hematocrit : 25.9 %  Platelet Count - Automated : 117 K/uL  Mean Cell Volume : 102.4 fl  Mean Cell Hemoglobin : 32.4 pg  Mean Cell Hemoglobin Concentration : 31.7 gm/dL  Auto Neutrophil # : x  Auto Lymphocyte # : x  Auto Monocyte # : x  Auto Eosinophil # : x  Auto Basophil # : x  Auto Neutrophil % : x  Auto Lymphocyte % : x  Auto Monocyte % : x  Auto Eosinophil % : x  Auto Basophil % : x    02-11    140  |  108  |  11  ----------------------------<  102<H>  4.0   |  22  |  0.58    Ca    8.8      11 Feb 2019 11:43  Mg     1.8     02-11    TPro  5.5<L>  /  Alb  3.5  /  TBili  0.5  /  DBili  x   /  AST  17  /  ALT  13  /  AlkPhos  42  02-11    PT/INR - ( 10 Feb 2019 06:54 )   PT: 10.2 sec;   INR: 0.91          PTT - ( 10 Feb 2019 06:54 )  PTT:22.7 sec                  RADIOLOGY & ADDITIONAL STUDIES (The following images were personally reviewed):

## 2019-02-11 NOTE — PROGRESS NOTE ADULT - SUBJECTIVE AND OBJECTIVE BOX
Patient without further BM  Some dizziness  last night  Moderate pain right ankle which apparently will require surgical repair    T<100 B\P 114\60 Pulse 78  Lungs clear  Card Nl S1&S2  Ext no edema    Port Chest Xray elevated left hemidiaphragm, otherwise normal  Hgb 8.2    Upper GI bleed, spoke to Dr Carvalho. For endoscopy today  Ortho involved, awaiting okay for repair  Vital signs stable    Dr Diogo Quijano  [phone]

## 2019-02-11 NOTE — PROGRESS NOTE ADULT - SUBJECTIVE AND OBJECTIVE BOX
Ortho Progress Note    Subjective:  68y Female s/p R ankle fracture, splinted yesterday. Patient seen and examined, doing well, pain endorsed but controlled. NPO for EGD to eval GI bleed today      Objective:    Vital Signs Last 24 Hrs  T(C): 36.6 (11 Feb 2019 06:14), Max: 37.1 (10 Feb 2019 09:36)  T(F): 97.8 (11 Feb 2019 06:14), Max: 98.7 (10 Feb 2019 09:36)  HR: 76 (11 Feb 2019 08:15) (70 - 85)  BP: 116/70 (11 Feb 2019 08:15) (100/52 - 128/59)  BP(mean): 88 (11 Feb 2019 08:15) (71 - 88)  RR: 18 (11 Feb 2019 08:15) (16 - 18)  SpO2: 95% (11 Feb 2019 08:15) (95% - 100%)    NAD, AOx3, comfortable    RLE  Dressing: C/D/I splint with ACE wrap  Motor: wiggles toes   Sensation: SILT toes  Pulses:   toes/foot WWP                            8.2    8.96  )-----------( 117      ( 10 Feb 2019 18:18 )             25.9         A/P:  68y Female s/p syncopal fall due to likely GI bleed, with SER4 equiv R ankle fracture  -Will need ortho surgery once/if medically cleared after anemic/syncopal/GI work up, please page ortho with updates regarding patient's surgical candidacy status   -Pain Control  -PT/NWB RLE w crutches  -DVT ppx: hold AM of surgery once scheduled  -rest of care per primary      Tom Simon MD, PGY-2  Ortho Pager: [phone]

## 2019-02-11 NOTE — PROGRESS NOTE ADULT - ATTENDING COMMENTS
Patient seen and examined with house-staff during bedside rounds.  Resident note read, including vitals, physical findings, laboratory data, and radiological reports.   Revisions included below.  Direct personal management at bed side and extensive interpretation of the data.  Plan was outlined and discussed in details with the housestaff.  Decision making of high complexity  Action taken for acute disease activity to reflect the level of care provided:  - medication reconciliation  - review laboratory data  Anemia due to GI loss, GI bleed, syncope, fracture of the right ankle  Hemoglobin is stable. Follow with EGD. Patient is hemodynamically stable. Continue to follow with orthopedic. I discussed the case with primary care

## 2019-02-11 NOTE — PROGRESS NOTE ADULT - PROBLEM SELECTOR PLAN 4
Splinted by ortho.  -Tramadol for pain  -f/u ortho for further pain mgmt recs  -CT tibia per ortho Splinted by ortho.  -Tramadol and Tylenol for pain  -f/u ortho for further pain mgmt recs  -CT tibia per ortho complete, f/u read  -possible transfer to ortho service for ORIF after completion of workup for UGIB

## 2019-02-11 NOTE — PROGRESS NOTE ADULT - PROBLEM SELECTOR PLAN 2
Pt with 2 syncopal episodes. Management as above. Pt with 2 syncopal episodes. Hgb stable at 8.2. Asymptomatic at this time. Management as above.

## 2019-02-11 NOTE — PROGRESS NOTE ADULT - SUBJECTIVE AND OBJECTIVE BOX
INTERVAL HPI/OVERNIGHT EVENTS: SHAHZAD, was NPO for EGD.    SUBJECTIVE: Patient seen and examined at bedside. Resting in bed, no complaints or concerns, anxious about upcoming EGD. Denies abd pain, no further episodes of diarrhea or melena, denies lightheadedness, headache, CP, palpitations.    OBJECTIVE:  VITAL SIGNS:  T(C): 36.8 (11 Feb 2019 09:39), Max: 37 (10 Feb 2019 13:53)  T(F): 98.2 (11 Feb 2019 09:39), Max: 98.6 (10 Feb 2019 13:53)  HR: 76 (11 Feb 2019 08:15) (70 - 85)  BP: 116/70 (11 Feb 2019 08:15) (100/52 - 124/57)  BP(mean): 88 (11 Feb 2019 08:15) (71 - 88)  RR: 18 (11 Feb 2019 08:15) (17 - 18)  SpO2: 95% (11 Feb 2019 08:15) (95% - 100%)    CAPILLARY BLOOD GLUCOSE  POCT Blood Glucose.: 144 mg/dL (10 Feb 2019 06:42)    PHYSICAL EXAM:  Gen: WDWN female resting comfortably in bed; NAD  HEENT: NC/AT, PERRL, EOMI, anicteric sclera, MMM, mild conjunctival pallor  Neck: supple; no JVD or thyromegaly  Resp: CTA B/L; no W/R/R, no retractions  CV: +S1/S2; RRR; no M/R/G; PMI non-displaced  GI: soft, NT/ND; no rebound or guarding; +BSx4  Extremities: WWP, no clubbing or cyanosis; no peripheral edema  Msk: NROM x4; R ankle splinted in ace bandage  Vascular: 2+ radial, femoral, DP/PT pulses B/L  Dermatologic: skin warm, dry and intact; no rashes, wounds, or scars  	ymphatic: no submandibular or cervical LAD  Neurologic: AAOx3; CNII-XII grossly intact; no focal deficits    MEDICATIONS:  atorvastatin 20 milliGRAM(s) Oral at bedtime  pantoprazole  Injectable 40 milliGRAM(s) IV Push every 12 hours    MEDICATIONS  (PRN):  acetaminophen   Tablet .. 650 milliGRAM(s) Oral every 6 hours PRN Mild Pain (1 - 3)  traMADol 50 milliGRAM(s) Oral every 4 hours PRN Moderate Pain (4 - 6)    ALLERGIES:  Allergies  penicillin (Rash)    LABS:             8.2    8.96  )-----------( 117      ( 10 Feb 2019 18:18 )             25.9     02-10  139  |  109<H>  |  47<H>  ----------------------------<  147<H>  4.1   |  20<L>  |  0.67    Ca    8.6      10 Feb 2019 06:54  Mg     1.5     02-10    TPro  5.2<L>  /  Alb  3.5  /  TBili  0.4  /  DBili  x   /  AST  12  /  ALT  14  /  AlkPhos  37<L>  02-10    PT/INR - ( 10 Feb 2019 06:54 )   PT: 10.2 sec;   INR: 0.91     PTT - ( 10 Feb 2019 06:54 )  PTT:22.7 sec    RADIOLOGY & ADDITIONAL TESTS: Reviewed.

## 2019-02-11 NOTE — PROGRESS NOTE ADULT - PROBLEM SELECTOR PLAN 3
Pt with dark stools and anemia requiring 1U PRBCs.  -protonix 40 Q12  -plan for EGD with Dr. Carvalho tomorrow  -NPO @ [facility] for EGD Pt with dark stools and anemia requiring 1U PRBCs.  -protonix 40 Q12  -plan for EGD with Dr. Carvalho today  -NPO for EGD

## 2019-02-11 NOTE — PROGRESS NOTE ADULT - PROBLEM SELECTOR PLAN 1
Pt presenting after 2 syncopal episodes in setting of diarrhea with dark/black stools after significant NSAID use s/p dental procedure.  -s/p 1U PRBCs with Hgb 8.2 Pt presenting after 2 syncopal episodes in setting of diarrhea with dark/black stools after significant NSAID use s/p dental procedure.  -s/p 1U PRBCs with Hgb 8.2  -plan for EGD for workup today, likely UGIB

## 2019-02-11 NOTE — PROGRESS NOTE ADULT - ASSESSMENT
EGD = big antral ulcer with few smaller ones, severe acute gastritis  no bleeding on current study    may eat today continue IV PPI for today

## 2019-02-12 LAB
ANION GAP SERPL CALC-SCNC: 11 MMOL/L — SIGNIFICANT CHANGE UP (ref 5–17)
APPEARANCE UR: CLEAR — SIGNIFICANT CHANGE UP
BILIRUB UR-MCNC: NEGATIVE — SIGNIFICANT CHANGE UP
BUN SERPL-MCNC: 11 MG/DL — SIGNIFICANT CHANGE UP (ref 7–23)
CALCIUM SERPL-MCNC: 8.9 MG/DL — SIGNIFICANT CHANGE UP (ref 8.4–10.5)
CHLORIDE SERPL-SCNC: 106 MMOL/L — SIGNIFICANT CHANGE UP (ref 96–108)
CO2 SERPL-SCNC: 25 MMOL/L — SIGNIFICANT CHANGE UP (ref 22–31)
COLOR SPEC: YELLOW — SIGNIFICANT CHANGE UP
CREAT SERPL-MCNC: 0.63 MG/DL — SIGNIFICANT CHANGE UP (ref 0.5–1.3)
DIFF PNL FLD: ABNORMAL
FERRITIN SERPL-MCNC: 163 NG/ML — HIGH (ref 15–150)
FOLATE SERPL-MCNC: >20 NG/ML — SIGNIFICANT CHANGE UP
GLUCOSE SERPL-MCNC: 105 MG/DL — HIGH (ref 70–99)
GLUCOSE UR QL: NEGATIVE — SIGNIFICANT CHANGE UP
HCT VFR BLD CALC: 24 % — LOW (ref 34.5–45)
HCT VFR BLD CALC: 24 % — LOW (ref 34.5–45)
HGB BLD-MCNC: 7.9 G/DL — LOW (ref 11.5–15.5)
HGB BLD-MCNC: 8.1 G/DL — LOW (ref 11.5–15.5)
IRON SATN MFR SERPL: 13 % — LOW (ref 14–50)
IRON SATN MFR SERPL: 30 UG/DL — SIGNIFICANT CHANGE UP (ref 30–160)
KETONES UR-MCNC: NEGATIVE — SIGNIFICANT CHANGE UP
LEUKOCYTE ESTERASE UR-ACNC: NEGATIVE — SIGNIFICANT CHANGE UP
MAGNESIUM SERPL-MCNC: 1.9 MG/DL — SIGNIFICANT CHANGE UP (ref 1.6–2.6)
MCHC RBC-ENTMCNC: 32.9 GM/DL — SIGNIFICANT CHANGE UP (ref 32–36)
MCHC RBC-ENTMCNC: 33.8 GM/DL — SIGNIFICANT CHANGE UP (ref 32–36)
MCHC RBC-ENTMCNC: 33.8 PG — SIGNIFICANT CHANGE UP (ref 27–34)
MCHC RBC-ENTMCNC: 34 PG — SIGNIFICANT CHANGE UP (ref 27–34)
MCV RBC AUTO: 100.8 FL — HIGH (ref 80–100)
MCV RBC AUTO: 102.6 FL — HIGH (ref 80–100)
NITRITE UR-MCNC: NEGATIVE — SIGNIFICANT CHANGE UP
NRBC # BLD: 0 /100 WBCS — SIGNIFICANT CHANGE UP (ref 0–0)
NRBC # BLD: 0 /100 WBCS — SIGNIFICANT CHANGE UP (ref 0–0)
PH UR: 6 — SIGNIFICANT CHANGE UP (ref 5–8)
PHOSPHATE SERPL-MCNC: 3.7 MG/DL — SIGNIFICANT CHANGE UP (ref 2.5–4.5)
PLATELET # BLD AUTO: 123 K/UL — LOW (ref 150–400)
PLATELET # BLD AUTO: 154 K/UL — SIGNIFICANT CHANGE UP (ref 150–400)
POTASSIUM SERPL-MCNC: 3.9 MMOL/L — SIGNIFICANT CHANGE UP (ref 3.5–5.3)
POTASSIUM SERPL-SCNC: 3.9 MMOL/L — SIGNIFICANT CHANGE UP (ref 3.5–5.3)
PROT UR-MCNC: NEGATIVE MG/DL — SIGNIFICANT CHANGE UP
RBC # BLD: 2.34 M/UL — LOW (ref 3.8–5.2)
RBC # BLD: 2.38 M/UL — LOW (ref 3.8–5.2)
RBC # FLD: 14.6 % — HIGH (ref 10.3–14.5)
RBC # FLD: 14.6 % — HIGH (ref 10.3–14.5)
SODIUM SERPL-SCNC: 142 MMOL/L — SIGNIFICANT CHANGE UP (ref 135–145)
SP GR SPEC: 1.02 — SIGNIFICANT CHANGE UP (ref 1–1.03)
SURGICAL PATHOLOGY STUDY: SIGNIFICANT CHANGE UP
TIBC SERPL-MCNC: 236 UG/DL — SIGNIFICANT CHANGE UP (ref 220–430)
TRANSFERRIN SERPL-MCNC: 198 MG/DL — LOW (ref 200–360)
UIBC SERPL-MCNC: 206 UG/DL — SIGNIFICANT CHANGE UP (ref 110–370)
UROBILINOGEN FLD QL: 0.2 E.U./DL — SIGNIFICANT CHANGE UP
VIT B12 SERPL-MCNC: 908 PG/ML — SIGNIFICANT CHANGE UP (ref 232–1245)
WBC # BLD: 5.86 K/UL — SIGNIFICANT CHANGE UP (ref 3.8–10.5)
WBC # BLD: 7.12 K/UL — SIGNIFICANT CHANGE UP (ref 3.8–10.5)
WBC # FLD AUTO: 5.86 K/UL — SIGNIFICANT CHANGE UP (ref 3.8–10.5)
WBC # FLD AUTO: 7.12 K/UL — SIGNIFICANT CHANGE UP (ref 3.8–10.5)

## 2019-02-12 PROCEDURE — 99232 SBSQ HOSP IP/OBS MODERATE 35: CPT | Mod: GC

## 2019-02-12 RX ORDER — HEPARIN SODIUM 5000 [USP'U]/ML
5000 INJECTION INTRAVENOUS; SUBCUTANEOUS EVERY 8 HOURS
Qty: 0 | Refills: 0 | Status: DISCONTINUED | OUTPATIENT
Start: 2019-02-12 | End: 2019-02-12

## 2019-02-12 RX ORDER — MAGNESIUM SULFATE 500 MG/ML
1 VIAL (ML) INJECTION ONCE
Qty: 0 | Refills: 0 | Status: COMPLETED | OUTPATIENT
Start: 2019-02-12 | End: 2019-02-12

## 2019-02-12 RX ORDER — POTASSIUM CHLORIDE 20 MEQ
20 PACKET (EA) ORAL ONCE
Qty: 0 | Refills: 0 | Status: COMPLETED | OUTPATIENT
Start: 2019-02-12 | End: 2019-02-12

## 2019-02-12 RX ORDER — IRON SUCROSE 20 MG/ML
200 INJECTION, SOLUTION INTRAVENOUS ONCE
Qty: 0 | Refills: 0 | Status: COMPLETED | OUTPATIENT
Start: 2019-02-12 | End: 2019-02-12

## 2019-02-12 RX ORDER — HEPARIN SODIUM 5000 [USP'U]/ML
5000 INJECTION INTRAVENOUS; SUBCUTANEOUS EVERY 8 HOURS
Qty: 0 | Refills: 0 | Status: DISCONTINUED | OUTPATIENT
Start: 2019-02-12 | End: 2019-02-13

## 2019-02-12 RX ORDER — PANTOPRAZOLE SODIUM 20 MG/1
40 TABLET, DELAYED RELEASE ORAL
Qty: 0 | Refills: 0 | Status: DISCONTINUED | OUTPATIENT
Start: 2019-02-12 | End: 2019-02-16

## 2019-02-12 RX ORDER — MAGNESIUM OXIDE 400 MG ORAL TABLET 241.3 MG
400 TABLET ORAL ONCE
Qty: 0 | Refills: 0 | Status: COMPLETED | OUTPATIENT
Start: 2019-02-12 | End: 2019-02-12

## 2019-02-12 RX ORDER — SODIUM CHLORIDE 9 MG/ML
1000 INJECTION INTRAMUSCULAR; INTRAVENOUS; SUBCUTANEOUS
Qty: 0 | Refills: 0 | Status: DISCONTINUED | OUTPATIENT
Start: 2019-02-12 | End: 2019-02-14

## 2019-02-12 RX ADMIN — Medication 20 MILLIEQUIVALENT(S): at 07:20

## 2019-02-12 RX ADMIN — PANTOPRAZOLE SODIUM 40 MILLIGRAM(S): 20 TABLET, DELAYED RELEASE ORAL at 19:03

## 2019-02-12 RX ADMIN — Medication 650 MILLIGRAM(S): at 15:04

## 2019-02-12 RX ADMIN — TRAMADOL HYDROCHLORIDE 50 MILLIGRAM(S): 50 TABLET ORAL at 23:50

## 2019-02-12 RX ADMIN — Medication 650 MILLIGRAM(S): at 14:14

## 2019-02-12 RX ADMIN — PANTOPRAZOLE SODIUM 40 MILLIGRAM(S): 20 TABLET, DELAYED RELEASE ORAL at 05:43

## 2019-02-12 RX ADMIN — Medication 650 MILLIGRAM(S): at 07:54

## 2019-02-12 RX ADMIN — IRON SUCROSE 110 MILLIGRAM(S): 20 INJECTION, SOLUTION INTRAVENOUS at 21:29

## 2019-02-12 RX ADMIN — Medication 100 GRAM(S): at 09:13

## 2019-02-12 RX ADMIN — ATORVASTATIN CALCIUM 20 MILLIGRAM(S): 80 TABLET, FILM COATED ORAL at 22:40

## 2019-02-12 RX ADMIN — HEPARIN SODIUM 5000 UNIT(S): 5000 INJECTION INTRAVENOUS; SUBCUTANEOUS at 22:40

## 2019-02-12 RX ADMIN — MAGNESIUM OXIDE 400 MG ORAL TABLET 400 MILLIGRAM(S): 241.3 TABLET ORAL at 14:03

## 2019-02-12 RX ADMIN — SODIUM CHLORIDE 100 MILLILITER(S): 9 INJECTION INTRAMUSCULAR; INTRAVENOUS; SUBCUTANEOUS at 23:52

## 2019-02-12 RX ADMIN — Medication 650 MILLIGRAM(S): at 07:24

## 2019-02-12 NOTE — PROGRESS NOTE ADULT - SUBJECTIVE AND OBJECTIVE BOX
INTERVAL HPI/OVERNIGHT EVENTS: SHAHZAD overnight.    SUBJECTIVE: Patient seen and examined at bedside. Resting in bed, no complaints or concerns. Reports one BM yesterday PM, loose and dark. Denies abd pain, diarrhea, fever/chills, lightheadedness, headache, CP, SOB.    OBJECTIVE:  VITAL SIGNS:  T(C): 36.6 (12 Feb 2019 05:00), Max: 36.9 (11 Feb 2019 22:00)  T(F): 97.8 (12 Feb 2019 05:00), Max: 98.4 (11 Feb 2019 22:00)  HR: 76 (12 Feb 2019 05:00) (70 - 88)  BP: 106/54 (12 Feb 2019 05:00) (106/54 - 139/63)  BP(mean): 77 (12 Feb 2019 05:00) (77 - 90)  RR: 16 (12 Feb 2019 05:00) (14 - 17)  SpO2: 97% (12 Feb 2019 05:00) (95% - 100%)    02-11 @ 07:01 - 02-12 @ 07:00  --------------------------------------------------------  IN: 0 mL / OUT: 301 mL / NET: -301 mL    02-12 @ 07:01  - 02-12 @ 08:43  --------------------------------------------------------  IN: 0 mL / OUT: 1300 mL / NET: -1300 mL    PHYSICAL EXAM:      MEDICATIONS:  atorvastatin 20 milliGRAM(s) Oral at bedtime  pantoprazole  Injectable 40 milliGRAM(s) IV Push every 12 hours    MEDICATIONS  (PRN):  acetaminophen   Tablet .. 650 milliGRAM(s) Oral every 6 hours PRN Mild Pain (1 - 3)  traMADol 50 milliGRAM(s) Oral every 4 hours PRN Moderate Pain (4 - 6)    ALLERGIES:  Allergies  penicillin (Rash)    LABS:             7.9    5.86  )-----------( 123      ( 12 Feb 2019 05:55 )             24.0     02-12    142  |  106  |  11  ----------------------------<  105<H>  3.9   |  25  |  0.63    Ca    8.9      12 Feb 2019 05:55  Phos  3.7     02-12  Mg     1.9     02-12    TPro  5.5<L>  /  Alb  3.5  /  TBili  0.5  /  DBili  x   /  AST  17  /  ALT  13  /  AlkPhos  42  02-11    RADIOLOGY & ADDITIONAL TESTS: Reviewed. INTERVAL HPI/OVERNIGHT EVENTS: SHAHZAD overnight.    SUBJECTIVE: Patient seen and examined at bedside. Resting in bed, no complaints or concerns. Reports one BM yesterday PM, loose and dark. Denies abd pain, diarrhea, fever/chills, lightheadedness, headache, CP, SOB.    OBJECTIVE:  VITAL SIGNS:  T(C): 36.6 (12 Feb 2019 05:00), Max: 36.9 (11 Feb 2019 22:00)  T(F): 97.8 (12 Feb 2019 05:00), Max: 98.4 (11 Feb 2019 22:00)  HR: 76 (12 Feb 2019 05:00) (70 - 88)  BP: 106/54 (12 Feb 2019 05:00) (106/54 - 139/63)  BP(mean): 77 (12 Feb 2019 05:00) (77 - 90)  RR: 16 (12 Feb 2019 05:00) (14 - 17)  SpO2: 97% (12 Feb 2019 05:00) (95% - 100%)    02-11 @ 07:01 - 02-12 @ 07:00  --------------------------------------------------------  IN: 0 mL / OUT: 301 mL / NET: -301 mL    02-12 @ 07:01  -  02-12 @ 08:43  --------------------------------------------------------  IN: 0 mL / OUT: 1300 mL / NET: -1300 mL    PHYSICAL EXAM:  Gen: WDWN female resting comfortably in bed; NAD  HEENT: NC/AT, PERRL, EOMI, anicteric sclera, MMM  Neck: supple; no JVD or thyromegaly  Resp: CTA B/L; no W/R/R, no retractions  CV: +S1/S2; RRR; no M/R/G; PMI non-displaced  GI: soft, NT/ND; no rebound or guarding; +BSx4  Extremities: WWP, no clubbing or cyanosis; no peripheral edema  Msk: NROM x4; R ankle splinted in ace bandage  Vascular: 2+ radial, femoral, DP/PT pulses B/L  Dermatologic: skin warm, dry and intact; no rashes, wounds, or scars  	ymphatic: no submandibular or cervical LAD  Neurologic: AAOx3; CNII-XII grossly intact; no focal deficits    MEDICATIONS:  atorvastatin 20 milliGRAM(s) Oral at bedtime  pantoprazole  Injectable 40 milliGRAM(s) IV Push every 12 hours    MEDICATIONS  (PRN):  acetaminophen   Tablet .. 650 milliGRAM(s) Oral every 6 hours PRN Mild Pain (1 - 3)  traMADol 50 milliGRAM(s) Oral every 4 hours PRN Moderate Pain (4 - 6)    ALLERGIES:  Allergies  penicillin (Rash)    LABS:             7.9    5.86  )-----------( 123      ( 12 Feb 2019 05:55 )             24.0     02-12    142  |  106  |  11  ----------------------------<  105<H>  3.9   |  25  |  0.63    Ca    8.9      12 Feb 2019 05:55  Phos  3.7     02-12  Mg     1.9     02-12    TPro  5.5<L>  /  Alb  3.5  /  TBili  0.5  /  DBili  x   /  AST  17  /  ALT  13  /  AlkPhos  42  02-11    RADIOLOGY & ADDITIONAL TESTS: Reviewed.

## 2019-02-12 NOTE — PROGRESS NOTE ADULT - ASSESSMENT
69yo F with PMH of HLD presenting after syncopal anemia, found to have symptomatic anemia requiring transfusion after GIB in setting of NSAID use and diarrhea, s/p EGD clear from GI standpoint. Pt also with fracture of ankle, splinted, pending ORIF tomorrow. 69yo F with PMH of HLD presenting after syncopal anemia, found to have symptomatic acute blood loss anemia requiring transfusion after GIB in setting of NSAID use and diarrhea, s/p EGD clear from GI standpoint. Pt also with fracture of ankle, splinted, pending ORIF tomorrow.

## 2019-02-12 NOTE — PHYSICAL THERAPY INITIAL EVALUATION ADULT - PERTINENT HX OF CURRENT PROBLEM, REHAB EVAL
68F experienced loose diarrhea with dark, nearly black stools (~8-10 episodes on Friday, >20 episodes yesterday) and cramping abd pain which is now resolved. syncopal episode last night when getting up to go to the bathroom, fell and sustained traumatic injury to her R ankle (unable to bear weight), followed by a second syncopal episode later in the night prompting presentation to the ED

## 2019-02-12 NOTE — PHYSICAL THERAPY INITIAL EVALUATION ADULT - GAIT DEVIATIONS NOTED, PT EVAL
decreased weight-shifting ability/SOB, slightly unsteady, 1 seated rest break x 2 minutes, HR up to 120 bpm. SP02 > 96% on room air, pain RLE in dependent position, decreased step clearance

## 2019-02-12 NOTE — PHYSICAL THERAPY INITIAL EVALUATION ADULT - CRITERIA FOR SKILLED THERAPEUTIC INTERVENTIONS
functional limitations in following categories/rehab potential/anticipated discharge recommendation/impairments found/therapy frequency

## 2019-02-12 NOTE — PROGRESS NOTE ADULT - SUBJECTIVE AND OBJECTIVE BOX
Pt seen and examined   feels tired and weak  no vomiting  no abdominal pain  tolerated diet    REVIEW OF SYSTEMS:  Constitutional: No fever,  Cardiovascular: No chest pain, palpitations, dizziness or leg swelling  Gastrointestinal: No abdominal or epigastric pain. No nausea, vomiting or hematemesis;  No melena or hematochezia.  Skin: No itching, burning, rashes or lesions       MEDICATIONS:  MEDICATIONS  (STANDING):  atorvastatin 20 milliGRAM(s) Oral at bedtime  pantoprazole  Injectable 40 milliGRAM(s) IV Push every 12 hours  sodium chloride 0.9%. 1000 milliLiter(s) (100 mL/Hr) IV Continuous <Continuous>    MEDICATIONS  (PRN):  acetaminophen   Tablet .. 650 milliGRAM(s) Oral every 6 hours PRN Mild Pain (1 - 3)  traMADol 50 milliGRAM(s) Oral every 4 hours PRN Moderate Pain (4 - 6)      Allergies    penicillin (Rash)    Intolerances        Vital Signs Last 24 Hrs  T(C): 36.9 (12 Feb 2019 10:11), Max: 36.9 (11 Feb 2019 22:00)  T(F): 98.5 (12 Feb 2019 10:11), Max: 98.5 (12 Feb 2019 10:11)  HR: 78 (12 Feb 2019 09:30) (70 - 92)  BP: 122/63 (12 Feb 2019 09:30) (106/54 - 139/63)  BP(mean): 86 (12 Feb 2019 09:30) (77 - 90)  RR: 26 (12 Feb 2019 09:30) (10 - 26)  SpO2: 99% (12 Feb 2019 09:30) (95% - 100%)    02-11 @ 07:01  -  02-12 @ 07:00  --------------------------------------------------------  IN: 0 mL / OUT: 301 mL / NET: -301 mL    02-12 @ 07:01  -  02-12 @ 11:55  --------------------------------------------------------  IN: 0 mL / OUT: 1300 mL / NET: -1300 mL        PHYSICAL EXAM:    General: Well developed; well nourished; in no acute distress  HEENT: MMM, conjunctiva and sclera clear  Gastrointestinal: Soft non-tender non-distended; Normal bowel sounds; No hepatosplenomegaly  Skin: Warm and dry. No obvious rash    LABS:      CBC Full  -  ( 12 Feb 2019 05:55 )  WBC Count : 5.86 K/uL  Hemoglobin : 7.9 g/dL  Hematocrit : 24.0 %  Platelet Count - Automated : 123 K/uL  Mean Cell Volume : 102.6 fl  Mean Cell Hemoglobin : 33.8 pg  Mean Cell Hemoglobin Concentration : 32.9 gm/dL  Auto Neutrophil # : x  Auto Lymphocyte # : x  Auto Monocyte # : x  Auto Eosinophil # : x  Auto Basophil # : x  Auto Neutrophil % : x  Auto Lymphocyte % : x  Auto Monocyte % : x  Auto Eosinophil % : x  Auto Basophil % : x    02-12    142  |  106  |  11  ----------------------------<  105<H>  3.9   |  25  |  0.63    Ca    8.9      12 Feb 2019 05:55  Phos  3.7     02-12  Mg     1.9     02-12    TPro  5.5<L>  /  Alb  3.5  /  TBili  0.5  /  DBili  x   /  AST  17  /  ALT  13  /  AlkPhos  42  02-11                      RADIOLOGY & ADDITIONAL STUDIES (The following images were personally reviewed):

## 2019-02-12 NOTE — PROGRESS NOTE ADULT - PROBLEM SELECTOR PLAN 3
Pt with dark stools and anemia requiring 1U PRBCs.  -protonix 40 Q12 switching to PO today  -EGD yesterday with non-bleeding ulcer with heaped borders, biopsy taken, also with gastritis but no active bleed  -Pt is clear from GI and medicine standpoint for ORIF procedure tomorrow

## 2019-02-12 NOTE — PROGRESS NOTE ADULT - PROBLEM SELECTOR PLAN 4
Splinted by ortho.  -Tramadol and Tylenol for pain  -f/u ortho for further pain mgmt recs  -plan for ORIF tomorrow  -NPO @ [facility] for procedure  -Pt is clear from GI and medicine standpoint for ORIF procedure tomorrow

## 2019-02-12 NOTE — PROGRESS NOTE ADULT - SUBJECTIVE AND OBJECTIVE BOX
My initial note from 6:45 AM was never saved  That document stated that patient had some abdominal cramps & dark diarrhea during the night without dyspnea, chest pain, palpitations  Also reviewed vital signs with a B\P I recorded of 100\60, pulse of 72 & temperature < 100  Lungs were clear, cardiac exam was normal & extremities without edema  Hgb had fallen to 7.5 last night with a 7.9 this AM associated with normal BUN & creatinine    I had spoken to orthopedic housestaff, as well as medical housestaff, telling them that I was clearing patient for surgery as long as Hgb remained stable.   I also asked medical staff to give iv iron.  Do not see repeat Hgb in results, nor do I see an order for iv iron.  Will speak to ortho & medical staff    Dr Diogo Quijano  [phone]

## 2019-02-12 NOTE — PROGRESS NOTE ADULT - PROBLEM SELECTOR PLAN 1
Pt presenting after 2 syncopal episodes in setting of diarrhea with dark/black stools after significant NSAID use s/p dental procedure.  -s/p 1U PRBCs with Hgb stable  -EGD yesterday with non-bleeding ulcer with heaped borders, biopsy taken, also with gastritis but no active bleed  -Pt is clear from GI and medicine standpoint for ORIF procedure tomorrow

## 2019-02-12 NOTE — PHYSICAL THERAPY INITIAL EVALUATION ADULT - GENERAL OBSERVATIONS, REHAB EVAL
Received supine in NAD, denies pain at rest +EKG, IV hep, RLE splint. Left as found +RN Roseanna hoffman

## 2019-02-12 NOTE — PROGRESS NOTE ADULT - SUBJECTIVE AND OBJECTIVE BOX
Ortho Progress Note    Subjective:  68y Female s/p R ankle fracture, splinted yesterday. Patient seen and examined, doing well, pain endorsed but controlled. NPO for EGD to eval GI bleed today      Objective:      Vital Signs Last 24 Hrs  T(C): 36.6 (12 Feb 2019 02:02), Max: 36.9 (11 Feb 2019 22:00)  T(F): 97.9 (12 Feb 2019 02:02), Max: 98.4 (11 Feb 2019 22:00)  HR: 76 (12 Feb 2019 05:00) (70 - 88)  BP: 106/54 (12 Feb 2019 05:00) (106/54 - 139/63)  BP(mean): 77 (12 Feb 2019 05:00) (77 - 90)  ABP: --  ABP(mean): --  RR: 16 (12 Feb 2019 05:00) (14 - 18)  SpO2: 97% (12 Feb 2019 05:00) (95% - 100%)    NAD, AOx3, comfortable    RLE  Dressing: C/D/I splint with ACE wrap  Motor: wiggles toes   Sensation: SILT toes  Pulses:   toes/foot WWP                            8.2    8.96  )-----------( 117      ( 10 Feb 2019 18:18 )             25.9         A/P:  68y Female s/p syncopal fall due to likely GI bleed, with SER4 equiv R ankle fracture  -awaiting resolution of medical issues  -ankle surgery can done during this admission or after discharge, up to patient preference (wants to do this admission) and surgeon availability   -awaiting medical/GI clearance for surgery after work up  -PT/NWB RLE w crutches - ordered PT for crutch training  -DVT ppx: hold AM of surgery once scheduled  -rest of care per primary  -ortho will follow, will not consider accepting patient onto service until after surgery      Tom Simon MD, PGY-2  Ortho Pager: [phone] Ortho Progress Note    Subjective:  68y Female s/p R ankle fracture, splinted yesterday. Patient seen and examined, doing well, pain endorsed but controlled. NPO for EGD to eval GI bleed today      Objective:      Vital Signs Last 24 Hrs  T(C): 36.6 (12 Feb 2019 02:02), Max: 36.9 (11 Feb 2019 22:00)  T(F): 97.9 (12 Feb 2019 02:02), Max: 98.4 (11 Feb 2019 22:00)  HR: 76 (12 Feb 2019 05:00) (70 - 88)  BP: 106/54 (12 Feb 2019 05:00) (106/54 - 139/63)  BP(mean): 77 (12 Feb 2019 05:00) (77 - 90)  ABP: --  ABP(mean): --  RR: 16 (12 Feb 2019 05:00) (14 - 18)  SpO2: 97% (12 Feb 2019 05:00) (95% - 100%)    NAD, AOx3, comfortable    RLE  Dressing: C/D/I splint with ACE wrap  Motor: wiggles toes   Sensation: SILT toes  Pulses:   toes/foot WWP                            8.2    8.96  )-----------( 117      ( 10 Feb 2019 18:18 )             25.9         A/P:  68y Female s/p syncopal fall due to likely GI bleed, with SER4 equiv R ankle fracture    -awaiting resolution of medical issues - if cleared today can add on for surgery tomrrow with Dr Banerjee    -awaiting medical/GI clearance for surgery after work up  -PT/NWB RLE w crutches - ordered PT for crutch training  -DVT ppx: hold AM of surgery once scheduled  -rest of care per primary  -ortho will follow    Tom Simon MD, PGY-2  Ortho Pager: [phone]

## 2019-02-13 LAB
ANION GAP SERPL CALC-SCNC: 9 MMOL/L — SIGNIFICANT CHANGE UP (ref 5–17)
APTT BLD: 30.8 SEC — SIGNIFICANT CHANGE UP (ref 27.5–36.3)
BUN SERPL-MCNC: 12 MG/DL — SIGNIFICANT CHANGE UP (ref 7–23)
CALCIUM SERPL-MCNC: 9 MG/DL — SIGNIFICANT CHANGE UP (ref 8.4–10.5)
CHLORIDE SERPL-SCNC: 109 MMOL/L — HIGH (ref 96–108)
CO2 SERPL-SCNC: 24 MMOL/L — SIGNIFICANT CHANGE UP (ref 22–31)
CREAT SERPL-MCNC: 0.63 MG/DL — SIGNIFICANT CHANGE UP (ref 0.5–1.3)
GLUCOSE SERPL-MCNC: 110 MG/DL — HIGH (ref 70–99)
HCT VFR BLD CALC: 23.3 % — LOW (ref 34.5–45)
HCT VFR BLD CALC: 29.9 % — LOW (ref 34.5–45)
HGB BLD-MCNC: 10.1 G/DL — LOW (ref 11.5–15.5)
HGB BLD-MCNC: 7.4 G/DL — LOW (ref 11.5–15.5)
INR BLD: 0.93 — SIGNIFICANT CHANGE UP (ref 0.88–1.16)
MCHC RBC-ENTMCNC: 31.8 GM/DL — LOW (ref 32–36)
MCHC RBC-ENTMCNC: 32.6 PG — SIGNIFICANT CHANGE UP (ref 27–34)
MCHC RBC-ENTMCNC: 32.9 PG — SIGNIFICANT CHANGE UP (ref 27–34)
MCHC RBC-ENTMCNC: 33.8 GM/DL — SIGNIFICANT CHANGE UP (ref 32–36)
MCV RBC AUTO: 102.6 FL — HIGH (ref 80–100)
MCV RBC AUTO: 97.4 FL — SIGNIFICANT CHANGE UP (ref 80–100)
NRBC # BLD: 0 /100 WBCS — SIGNIFICANT CHANGE UP (ref 0–0)
NRBC # BLD: 0 /100 WBCS — SIGNIFICANT CHANGE UP (ref 0–0)
PLATELET # BLD AUTO: 151 K/UL — SIGNIFICANT CHANGE UP (ref 150–400)
PLATELET # BLD AUTO: 173 K/UL — SIGNIFICANT CHANGE UP (ref 150–400)
POTASSIUM SERPL-MCNC: 4 MMOL/L — SIGNIFICANT CHANGE UP (ref 3.5–5.3)
POTASSIUM SERPL-SCNC: 4 MMOL/L — SIGNIFICANT CHANGE UP (ref 3.5–5.3)
PROTHROM AB SERPL-ACNC: 10.5 SEC — SIGNIFICANT CHANGE UP (ref 10–12.9)
RBC # BLD: 2.27 M/UL — LOW (ref 3.8–5.2)
RBC # BLD: 3.07 M/UL — LOW (ref 3.8–5.2)
RBC # FLD: 14.5 % — SIGNIFICANT CHANGE UP (ref 10.3–14.5)
RBC # FLD: 17.8 % — HIGH (ref 10.3–14.5)
SODIUM SERPL-SCNC: 142 MMOL/L — SIGNIFICANT CHANGE UP (ref 135–145)
WBC # BLD: 5.08 K/UL — SIGNIFICANT CHANGE UP (ref 3.8–10.5)
WBC # BLD: 8.6 K/UL — SIGNIFICANT CHANGE UP (ref 3.8–10.5)
WBC # FLD AUTO: 5.08 K/UL — SIGNIFICANT CHANGE UP (ref 3.8–10.5)
WBC # FLD AUTO: 8.6 K/UL — SIGNIFICANT CHANGE UP (ref 3.8–10.5)

## 2019-02-13 PROCEDURE — 99232 SBSQ HOSP IP/OBS MODERATE 35: CPT | Mod: GC

## 2019-02-13 PROCEDURE — 73610 X-RAY EXAM OF ANKLE: CPT | Mod: 26,RT

## 2019-02-13 RX ORDER — SENNA PLUS 8.6 MG/1
2 TABLET ORAL AT BEDTIME
Qty: 0 | Refills: 0 | Status: DISCONTINUED | OUTPATIENT
Start: 2019-02-13 | End: 2019-02-16

## 2019-02-13 RX ORDER — LANOLIN ALCOHOL/MO/W.PET/CERES
3 CREAM (GRAM) TOPICAL AT BEDTIME
Qty: 0 | Refills: 0 | Status: DISCONTINUED | OUTPATIENT
Start: 2019-02-13 | End: 2019-02-16

## 2019-02-13 RX ORDER — MORPHINE SULFATE 50 MG/1
2 CAPSULE, EXTENDED RELEASE ORAL EVERY 4 HOURS
Qty: 0 | Refills: 0 | Status: DISCONTINUED | OUTPATIENT
Start: 2019-02-13 | End: 2019-02-13

## 2019-02-13 RX ORDER — POLYETHYLENE GLYCOL 3350 17 G/17G
17 POWDER, FOR SOLUTION ORAL DAILY
Qty: 0 | Refills: 0 | Status: DISCONTINUED | OUTPATIENT
Start: 2019-02-13 | End: 2019-02-16

## 2019-02-13 RX ORDER — CEFAZOLIN SODIUM 1 G
2000 VIAL (EA) INJECTION EVERY 8 HOURS
Qty: 0 | Refills: 0 | Status: COMPLETED | OUTPATIENT
Start: 2019-02-14 | End: 2019-02-14

## 2019-02-13 RX ORDER — DOCUSATE SODIUM 100 MG
100 CAPSULE ORAL THREE TIMES A DAY
Qty: 0 | Refills: 0 | Status: DISCONTINUED | OUTPATIENT
Start: 2019-02-13 | End: 2019-02-16

## 2019-02-13 RX ORDER — OXYCODONE HYDROCHLORIDE 5 MG/1
5 TABLET ORAL EVERY 4 HOURS
Qty: 0 | Refills: 0 | Status: DISCONTINUED | OUTPATIENT
Start: 2019-02-13 | End: 2019-02-16

## 2019-02-13 RX ORDER — BENZOCAINE AND MENTHOL 5; 1 G/100ML; G/100ML
1 LIQUID ORAL THREE TIMES A DAY
Qty: 0 | Refills: 0 | Status: DISCONTINUED | OUTPATIENT
Start: 2019-02-13 | End: 2019-02-16

## 2019-02-13 RX ORDER — MORPHINE SULFATE 50 MG/1
4 CAPSULE, EXTENDED RELEASE ORAL
Qty: 0 | Refills: 0 | Status: DISCONTINUED | OUTPATIENT
Start: 2019-02-13 | End: 2019-02-13

## 2019-02-13 RX ORDER — OXYCODONE HYDROCHLORIDE 5 MG/1
10 TABLET ORAL EVERY 4 HOURS
Qty: 0 | Refills: 0 | Status: DISCONTINUED | OUTPATIENT
Start: 2019-02-13 | End: 2019-02-16

## 2019-02-13 RX ORDER — ONDANSETRON 8 MG/1
4 TABLET, FILM COATED ORAL EVERY 6 HOURS
Qty: 0 | Refills: 0 | Status: DISCONTINUED | OUTPATIENT
Start: 2019-02-13 | End: 2019-02-16

## 2019-02-13 RX ORDER — MAGNESIUM HYDROXIDE 400 MG/1
30 TABLET, CHEWABLE ORAL DAILY
Qty: 0 | Refills: 0 | Status: DISCONTINUED | OUTPATIENT
Start: 2019-02-13 | End: 2019-02-16

## 2019-02-13 RX ORDER — HYDROMORPHONE HYDROCHLORIDE 2 MG/ML
0.5 INJECTION INTRAMUSCULAR; INTRAVENOUS; SUBCUTANEOUS
Qty: 0 | Refills: 0 | Status: DISCONTINUED | OUTPATIENT
Start: 2019-02-13 | End: 2019-02-14

## 2019-02-13 RX ORDER — HYDROMORPHONE HYDROCHLORIDE 2 MG/ML
0.5 INJECTION INTRAMUSCULAR; INTRAVENOUS; SUBCUTANEOUS EVERY 4 HOURS
Qty: 0 | Refills: 0 | Status: DISCONTINUED | OUTPATIENT
Start: 2019-02-13 | End: 2019-02-16

## 2019-02-13 RX ADMIN — SODIUM CHLORIDE 100 MILLILITER(S): 9 INJECTION INTRAMUSCULAR; INTRAVENOUS; SUBCUTANEOUS at 18:32

## 2019-02-13 RX ADMIN — Medication 650 MILLIGRAM(S): at 08:28

## 2019-02-13 RX ADMIN — PANTOPRAZOLE SODIUM 40 MILLIGRAM(S): 20 TABLET, DELAYED RELEASE ORAL at 06:49

## 2019-02-13 RX ADMIN — TRAMADOL HYDROCHLORIDE 50 MILLIGRAM(S): 50 TABLET ORAL at 00:50

## 2019-02-13 RX ADMIN — Medication 100 MILLIGRAM(S): at 21:59

## 2019-02-13 RX ADMIN — Medication 3 MILLIGRAM(S): at 02:04

## 2019-02-13 RX ADMIN — PANTOPRAZOLE SODIUM 40 MILLIGRAM(S): 20 TABLET, DELAYED RELEASE ORAL at 18:58

## 2019-02-13 RX ADMIN — SODIUM CHLORIDE 100 MILLILITER(S): 9 INJECTION INTRAMUSCULAR; INTRAVENOUS; SUBCUTANEOUS at 08:29

## 2019-02-13 RX ADMIN — Medication 3 MILLIGRAM(S): at 22:00

## 2019-02-13 RX ADMIN — ATORVASTATIN CALCIUM 20 MILLIGRAM(S): 80 TABLET, FILM COATED ORAL at 21:59

## 2019-02-13 RX ADMIN — Medication 2000 MILLIGRAM(S): at 23:55

## 2019-02-13 NOTE — BRIEF OPERATIVE NOTE - PROCEDURE
<<-----Click on this checkbox to enter Procedure Open reduction and internal fixation (ORIF) of ankle  02/13/2019  right  Active  DFEGHHI

## 2019-02-13 NOTE — PROGRESS NOTE ADULT - SUBJECTIVE AND OBJECTIVE BOX
Ortho Progress Note    Subjective:  68y Female s/p R ankle fracture, splinted yesterday. Patient seen and examined, doing well, pain endorsed but controlled. NPO for OR today      Objective:      Vital Signs Last 24 Hrs  T(C): 36.4 (13 Feb 2019 05:46), Max: 36.9 (12 Feb 2019 10:11)  T(F): 97.5 (13 Feb 2019 05:46), Max: 98.5 (12 Feb 2019 10:11)  HR: 70 (13 Feb 2019 04:15) (70 - 92)  BP: 103/67 (13 Feb 2019 04:15) (103/67 - 124/64)  BP(mean): 79 (13 Feb 2019 04:15) (79 - 86)  RR: 19 (13 Feb 2019 04:15) (10 - 26)  SpO2: 92% (13 Feb 2019 04:15) (92% - 100%)    NAD, AOx3, comfortable    RLE  Dressing: C/D/I splint with ACE wrap  Motor: wiggles toes   Sensation: SILT toes  Pulses:   toes/foot WWP                            8.2    8.96  )-----------( 117      ( 10 Feb 2019 18:18 )             25.9         A/P:  68y Female s/p syncopal fall due to likely GI bleed, with SER4 equiv R ankle fracture    -awaiting resolution of medical issues - if cleared today can add on for surgery today with Dr Banerjee    -OR today on add on list  -PT/NWB RLE w crutches - ordered PT for crutch training  -DVT ppx: hold AM of surgery  -rest of care per primary  -ortho will follow    Tom Simon MD, PGY-2  Ortho Pager: [phone] Ortho Progress Note    Subjective:  68y Female s/p R ankle fracture, splinted. Patient seen and examined, doing well, pain endorsed but controlled. NPO for OR today      Objective:      Vital Signs Last 24 Hrs  T(C): 36.4 (13 Feb 2019 05:46), Max: 36.9 (12 Feb 2019 10:11)  T(F): 97.5 (13 Feb 2019 05:46), Max: 98.5 (12 Feb 2019 10:11)  HR: 70 (13 Feb 2019 04:15) (70 - 92)  BP: 103/67 (13 Feb 2019 04:15) (103/67 - 124/64)  BP(mean): 79 (13 Feb 2019 04:15) (79 - 86)  RR: 19 (13 Feb 2019 04:15) (10 - 26)  SpO2: 92% (13 Feb 2019 04:15) (92% - 100%)    NAD, AOx3, comfortable    RLE  Dressing: C/D/I splint with ACE wrap  Motor: wiggles toes   Sensation: SILT toes  Pulses:   toes/foot WWP                            8.2    8.96  )-----------( 117      ( 10 Feb 2019 18:18 )             25.9         A/P:  68y Female s/p syncopal fall due to likely GI bleed, with SER4 equiv R ankle fracture    -awaiting resolution of medical issues - if cleared today can add on for surgery today with Dr Banerjee    -OR today on add on list  -PT/NWB RLE w crutches - ordered PT for crutch training  -DVT ppx: hold AM of surgery  -rest of care per primary  -ortho will follow    Tom Simon MD, PGY-2  Ortho Pager: [phone]

## 2019-02-13 NOTE — PROGRESS NOTE ADULT - ASSESSMENT
69yo F with PMH of HLD presenting after syncopal anemia, found to have symptomatic acute blood loss anemia requiring transfusion after GIB in setting of NSAID use and diarrhea, s/p EGD clear from GI standpoint. Pt also with fracture of ankle, splinted, pending ORIF today.

## 2019-02-13 NOTE — PROGRESS NOTE ADULT - SUBJECTIVE AND OBJECTIVE BOX
INTERVAL HPI/OVERNIGHT EVENTS: SHAHZAD.    SUBJECTIVE: Patient seen and examined at bedside. Resting comfortably in bed, no complaints at this time. Reports one BM overnight, loose but more formed than prior, dark but no blood seen. Denies abd pain, N/V/D, subjective fever/chills, CP, SOB.    OBJECTIVE:  VITAL SIGNS:  T(C): 37 (2019 13:25), Max: 37 (2019 13:25)  T(F): 98.6 (2019 13:25), Max: 98.6 (2019 13:25)  HR: 68 (2019 13:40) (68 - 88)  BP: 125/56 (2019 13:40) (103/67 - 125/56)  BP(mean): 78 (2019 08:42) (78 - 84)  RR: 17 (2019 13:40) (16 - 22)  SpO2: 99% (2019 13:40) (92% - 100%)    12 @ 07:01  -  -13 @ 07:00  --------------------------------------------------------  IN: 500 mL / OUT: 2250 mL / NET: -1750 mL    PHYSICAL EXAM:  Gen: WDWN female resting comfortably in bed; NAD  HEENT: NC/AT, PERRL, EOMI, anicteric sclera, MMM  Neck: supple; no JVD or thyromegaly  Resp: CTA B/L; no W/R/R, no retractions  CV: +S1/S2; RRR; no M/R/G; PMI non-displaced  GI: soft, NT/ND; no rebound or guarding; +BSx4  Extremities: WWP, no clubbing or cyanosis; no peripheral edema  Msk: NROM x4; R ankle splinted in ace bandage  Vascular: 2+ radial, femoral, DP/PT pulses B/L  Dermatologic: skin warm, dry and intact; no rashes, wounds, or scars  	ymphatic: no submandibular or cervical LAD  Neurologic: AAOx3; CNII-XII grossly intact; no focal deficits    MEDICATIONS:  atorvastatin 20 milliGRAM(s) Oral at bedtime  heparin  Injectable 5000 Unit(s) SubCutaneous every 8 hours  melatonin 3 milliGRAM(s) Oral at bedtime  pantoprazole    Tablet 40 milliGRAM(s) Oral two times a day  sodium chloride 0.9%. 1000 milliLiter(s) (100 mL/Hr) IV Continuous <Continuous>    MEDICATIONS  (PRN):  acetaminophen   Tablet .. 650 milliGRAM(s) Oral every 6 hours PRN Mild Pain (1 - 3)  traMADol 50 milliGRAM(s) Oral every 4 hours PRN Moderate Pain (4 - 6)    ALLERGIES:  Allergies  penicillin (Rash)    LABS:                        7.4    5.08  )-----------( 151      ( 2019 07:05 )             23.3     02-13    142  |  109<H>  |  12  ----------------------------<  110<H>  4.0   |  24  |  0.63    Ca    9.0      2019 07:05  Phos  3.7     02-12  Mg     1.9     02-12      PT/INR - ( 2019 07:05 )   PT: 10.5 sec;   INR: 0.93     PTT - ( 2019 07:05 )  PTT:30.8 sec    Urinalysis Basic - ( 2019 12:02 )  Color: Yellow / Appearance: Clear / S.025 / pH: x  Gluc: x / Ketone: NEGATIVE  / Bili: Negative / Urobili: 0.2 E.U./dL   Blood: x / Protein: NEGATIVE mg/dL / Nitrite: NEGATIVE   Leuk Esterase: NEGATIVE / RBC: < 5 /HPF / WBC < 5 /HPF   Sq Epi: x / Non Sq Epi: 5-10 /HPF / Bacteria: Present /HPF    RADIOLOGY & ADDITIONAL TESTS: Reviewed. *TRANSFER FROM [facility] TO [facility]*  HOSPITAL COURSE:  69yo F with PMH of HLD and depression, presenting after syncopal episodes x 2. Pt had a root canal done  with subsequent infection requiring washout, had been on PO Azithromycin as well as Ibuprofen (400mg-600mg Q4-6) and Tylenol. Since Thursday night/Friday AM, the patient has experienced loose diarrhea with dark, nearly black stools (~8-10 episodes on Friday, >20 episodes yesterday) and cramping abd pain which is now resolved. Pt had syncopal episode when getting up to go to the bathroom, fell and sustained traumatic injury to her R ankle (unable to bear weight), followed by a second syncopal episode later in the night prompting presentation to the ED. Pt admitted to [facility] for workup of GIB, underwent EGD which showed nonbleeding ulcer with heaped borders (biopsy taken) and gastritis but no active bleed, Hgb remained stable, pt cleared from GI/medicine standpoint for ORIF with ortho.    INTERVAL HPI/OVERNIGHT EVENTS: SHAHZAD.    SUBJECTIVE: Patient seen and examined at bedside. Resting comfortably in bed, no complaints at this time. Reports one BM overnight, loose but more formed than prior, dark but no blood seen. Denies abd pain, N/V/D, subjective fever/chills, CP, SOB.    OBJECTIVE:  VITAL SIGNS:  T(C): 37 (2019 13:25), Max: 37 (2019 13:25)  T(F): 98.6 (2019 13:25), Max: 98.6 (2019 13:25)  HR: 68 (2019 13:40) (68 - 88)  BP: 125/56 (2019 13:40) (103/67 - 125/56)  BP(mean): 78 (2019 08:42) (78 - 84)  RR: 17 (2019 13:40) (16 - 22)  SpO2: 99% (2019 13:40) (92% - 100%)    -12 @ 07:01  -  -13 @ 07:00  --------------------------------------------------------  IN: 500 mL / OUT: 2250 mL / NET: -1750 mL    PHYSICAL EXAM:  Gen: WDWN female resting comfortably in bed; NAD  HEENT: NC/AT, PERRL, EOMI, anicteric sclera, MMM  Neck: supple; no JVD or thyromegaly  Resp: CTA B/L; no W/R/R, no retractions  CV: +S1/S2; RRR; no M/R/G; PMI non-displaced  GI: soft, NT/ND; no rebound or guarding; +BSx4  Extremities: WWP, no clubbing or cyanosis; no peripheral edema  Msk: NROM x4; R ankle splinted in ace bandage  Vascular: 2+ radial, femoral, DP/PT pulses B/L  Dermatologic: skin warm, dry and intact; no rashes, wounds, or scars  	lymphatic: no submandibular or cervical LAD  Neurologic: AAOx3; CNII-XII grossly intact; no focal deficits    MEDICATIONS:  atorvastatin 20 milliGRAM(s) Oral at bedtime  heparin  Injectable 5000 Unit(s) SubCutaneous every 8 hours  melatonin 3 milliGRAM(s) Oral at bedtime  pantoprazole    Tablet 40 milliGRAM(s) Oral two times a day  sodium chloride 0.9%. 1000 milliLiter(s) (100 mL/Hr) IV Continuous <Continuous>    MEDICATIONS  (PRN):  acetaminophen   Tablet .. 650 milliGRAM(s) Oral every 6 hours PRN Mild Pain (1 - 3)  traMADol 50 milliGRAM(s) Oral every 4 hours PRN Moderate Pain (4 - 6)    ALLERGIES:  Allergies  penicillin (Rash)    LABS:             7.4    5.08  )-----------( 151      ( 2019 07:05 )             23.3     02-13  142  |  109<H>  |  12  ----------------------------<  110<H>  4.0   |  24  |  0.63    Ca    9.0      2019 07:05  Phos  3.7     02-12  Mg     1.9     02-12    PT/INR - ( 2019 07:05 )   PT: 10.5 sec;   INR: 0.93     PTT - ( 2019 07:05 )  PTT:30.8 sec    Urinalysis Basic - ( 2019 12:02 )  Color: Yellow / Appearance: Clear / S.025 / pH: x  Gluc: x / Ketone: NEGATIVE  / Bili: Negative / Urobili: 0.2 E.U./dL   Blood: x / Protein: NEGATIVE mg/dL / Nitrite: NEGATIVE   Leuk Esterase: NEGATIVE / RBC: < 5 /HPF / WBC < 5 /HPF   Sq Epi: x / Non Sq Epi: 5-10 /HPF / Bacteria: Present /HPF    RADIOLOGY & ADDITIONAL TESTS: Reviewed.

## 2019-02-13 NOTE — PROGRESS NOTE ADULT - PROBLEM SELECTOR PLAN 1
Pt presenting after 2 syncopal episodes in setting of diarrhea with dark/black stools after significant NSAID use s/p dental procedure.  -s/p 1U PRBCs with Hgb stable  -EGD with non-bleeding ulcer with heaped borders, biopsy taken, also with gastritis but no active bleed  -Pt is clear from GI and medicine standpoint for ORIF procedure tomorrow

## 2019-02-13 NOTE — PROGRESS NOTE ADULT - PROBLEM SELECTOR PLAN 4
Splinted by ortho.  -Tramadol and Tylenol for pain  -f/u ortho for further pain mgmt recs  -plan for ORIF tomorrow  -NPO for procedure  -Pt is clear from GI and medicine standpoint for ORIF procedure tomorrow

## 2019-02-13 NOTE — PROGRESS NOTE ADULT - PROBLEM SELECTOR PLAN 6
F: none  E: replete PRN, Mg > 2, K > 4  N: clears, NPO @ [facility] for EGD  DVT PPx: holding in setting of GIB  FULL CODE  Dispo: admit to [facility]
F: none  E: replete PRN, Mg > 2, K > 4  N: NPO for ORIF  DVT PPx: HSQ  FULL CODE  Dispo: ORIF today
F: none  E: replete PRN, Mg > 2, K > 4  N: regular diet, NPO @ [facility] for ORIF  DVT PPx: HSQ  FULL CODE  Dispo: [facility], ORIF tomorrow

## 2019-02-13 NOTE — PROGRESS NOTE ADULT - PROBLEM SELECTOR PROBLEM 4
Fracture of ankle, closed, right, initial encounter

## 2019-02-13 NOTE — PROGRESS NOTE ADULT - SUBJECTIVE AND OBJECTIVE BOX
Patient without abdominal pain, diarrhea, dyspnea, chest pain  Spoke to ortho last night about repeat CBC    T< 100 Pulse 78 B\P 100\56  Lungs clear  Card Nl S1& S2  Ext no edema  CBC last night Hgb 8.1 WBC 7120 [id number]    Patient stable & cleared for surgery  Received iv iron & should probably receive another dose postoperatively    Dr Diogo Quijano  [phone]

## 2019-02-14 ENCOUNTER — TRANSCRIPTION ENCOUNTER (OUTPATIENT)
Age: 69
End: 2019-02-14

## 2019-02-14 LAB
ANION GAP SERPL CALC-SCNC: 12 MMOL/L — SIGNIFICANT CHANGE UP (ref 5–17)
BUN SERPL-MCNC: 10 MG/DL — SIGNIFICANT CHANGE UP (ref 7–23)
CALCIUM SERPL-MCNC: 9.4 MG/DL — SIGNIFICANT CHANGE UP (ref 8.4–10.5)
CHLORIDE SERPL-SCNC: 102 MMOL/L — SIGNIFICANT CHANGE UP (ref 96–108)
CO2 SERPL-SCNC: 24 MMOL/L — SIGNIFICANT CHANGE UP (ref 22–31)
CREAT SERPL-MCNC: 0.65 MG/DL — SIGNIFICANT CHANGE UP (ref 0.5–1.3)
GLUCOSE SERPL-MCNC: 152 MG/DL — HIGH (ref 70–99)
HCT VFR BLD CALC: 28.3 % — LOW (ref 34.5–45)
HGB BLD-MCNC: 9.5 G/DL — LOW (ref 11.5–15.5)
MCHC RBC-ENTMCNC: 32.9 PG — SIGNIFICANT CHANGE UP (ref 27–34)
MCHC RBC-ENTMCNC: 33.6 GM/DL — SIGNIFICANT CHANGE UP (ref 32–36)
MCV RBC AUTO: 97.9 FL — SIGNIFICANT CHANGE UP (ref 80–100)
NRBC # BLD: 0 /100 WBCS — SIGNIFICANT CHANGE UP (ref 0–0)
PLATELET # BLD AUTO: 185 K/UL — SIGNIFICANT CHANGE UP (ref 150–400)
POTASSIUM SERPL-MCNC: 4 MMOL/L — SIGNIFICANT CHANGE UP (ref 3.5–5.3)
POTASSIUM SERPL-SCNC: 4 MMOL/L — SIGNIFICANT CHANGE UP (ref 3.5–5.3)
RBC # BLD: 2.89 M/UL — LOW (ref 3.8–5.2)
RBC # FLD: 17.2 % — HIGH (ref 10.3–14.5)
SODIUM SERPL-SCNC: 138 MMOL/L — SIGNIFICANT CHANGE UP (ref 135–145)
WBC # BLD: 8.84 K/UL — SIGNIFICANT CHANGE UP (ref 3.8–10.5)
WBC # FLD AUTO: 8.84 K/UL — SIGNIFICANT CHANGE UP (ref 3.8–10.5)

## 2019-02-14 RX ORDER — HEPARIN SODIUM 5000 [USP'U]/ML
5000 INJECTION INTRAVENOUS; SUBCUTANEOUS EVERY 8 HOURS
Qty: 0 | Refills: 0 | Status: DISCONTINUED | OUTPATIENT
Start: 2019-02-14 | End: 2019-02-16

## 2019-02-14 RX ORDER — POLYETHYLENE GLYCOL 3350 17 G/17G
17 POWDER, FOR SOLUTION ORAL
Qty: 0 | Refills: 0 | COMMUNITY
Start: 2019-02-14

## 2019-02-14 RX ORDER — SENNA PLUS 8.6 MG/1
2 TABLET ORAL
Qty: 0 | Refills: 0 | COMMUNITY
Start: 2019-02-14

## 2019-02-14 RX ORDER — DOCUSATE SODIUM 100 MG
1 CAPSULE ORAL
Qty: 0 | Refills: 0 | COMMUNITY
Start: 2019-02-14

## 2019-02-14 RX ORDER — IRON SUCROSE 20 MG/ML
200 INJECTION, SOLUTION INTRAVENOUS ONCE
Qty: 0 | Refills: 0 | Status: COMPLETED | OUTPATIENT
Start: 2019-02-14 | End: 2019-02-14

## 2019-02-14 RX ORDER — ACETAMINOPHEN 500 MG
650 TABLET ORAL EVERY 6 HOURS
Qty: 0 | Refills: 0 | Status: DISCONTINUED | OUTPATIENT
Start: 2019-02-14 | End: 2019-02-16

## 2019-02-14 RX ADMIN — HEPARIN SODIUM 5000 UNIT(S): 5000 INJECTION INTRAVENOUS; SUBCUTANEOUS at 21:08

## 2019-02-14 RX ADMIN — Medication 650 MILLIGRAM(S): at 12:00

## 2019-02-14 RX ADMIN — Medication 100 MILLIGRAM(S): at 05:49

## 2019-02-14 RX ADMIN — POLYETHYLENE GLYCOL 3350 17 GRAM(S): 17 POWDER, FOR SOLUTION ORAL at 11:03

## 2019-02-14 RX ADMIN — ATORVASTATIN CALCIUM 20 MILLIGRAM(S): 80 TABLET, FILM COATED ORAL at 21:09

## 2019-02-14 RX ADMIN — HEPARIN SODIUM 5000 UNIT(S): 5000 INJECTION INTRAVENOUS; SUBCUTANEOUS at 11:03

## 2019-02-14 RX ADMIN — Medication 100 MILLIGRAM(S): at 21:09

## 2019-02-14 RX ADMIN — Medication 3 MILLIGRAM(S): at 21:09

## 2019-02-14 RX ADMIN — Medication 650 MILLIGRAM(S): at 23:11

## 2019-02-14 RX ADMIN — BENZOCAINE AND MENTHOL 1 LOZENGE: 5; 1 LIQUID ORAL at 00:01

## 2019-02-14 RX ADMIN — OXYCODONE HYDROCHLORIDE 10 MILLIGRAM(S): 5 TABLET ORAL at 22:43

## 2019-02-14 RX ADMIN — Medication 650 MILLIGRAM(S): at 11:03

## 2019-02-14 RX ADMIN — Medication 2000 MILLIGRAM(S): at 07:38

## 2019-02-14 RX ADMIN — Medication 650 MILLIGRAM(S): at 18:00

## 2019-02-14 RX ADMIN — MAGNESIUM HYDROXIDE 30 MILLILITER(S): 400 TABLET, CHEWABLE ORAL at 17:09

## 2019-02-14 RX ADMIN — Medication 100 MILLIGRAM(S): at 14:02

## 2019-02-14 RX ADMIN — OXYCODONE HYDROCHLORIDE 10 MILLIGRAM(S): 5 TABLET ORAL at 23:43

## 2019-02-14 RX ADMIN — PANTOPRAZOLE SODIUM 40 MILLIGRAM(S): 20 TABLET, DELAYED RELEASE ORAL at 17:04

## 2019-02-14 RX ADMIN — IRON SUCROSE 110 MILLIGRAM(S): 20 INJECTION, SOLUTION INTRAVENOUS at 11:03

## 2019-02-14 RX ADMIN — PANTOPRAZOLE SODIUM 40 MILLIGRAM(S): 20 TABLET, DELAYED RELEASE ORAL at 05:49

## 2019-02-14 RX ADMIN — Medication 650 MILLIGRAM(S): at 17:04

## 2019-02-14 NOTE — DISCHARGE NOTE ADULT - MEDICATION SUMMARY - MEDICATIONS TO TAKE
I will START or STAY ON the medications listed below when I get home from the hospital:    oxyCODONE 5 mg oral tablet  -- 1 tab(s) by mouth every 4 hours, As needed, Mild Pain (1 - 3)  -- Indication: For Moderate pain    oxyCODONE 10 mg oral tablet  -- 1 tab(s) by mouth every 4 hours, As needed, Moderate Pain (4 - 6)  -- Indication: For Severe pain    acetaminophen 325 mg oral tablet  -- 2 tab(s) by mouth every 6 hours, As needed, Mild Pain (1 - 3)  -- Indication: For Mild pain    heparin  -- 5000 unit(s) subcutaneous every 8 hours  -- Indication: For Anti coagulant    sertraline 50 mg oral tablet  -- 1 tab(s) by mouth once a day  -- Indication: For Home med    atorvastatin 20 mg oral tablet  -- 1 tab(s) by mouth once a day  -- Indication: For Home med    senna oral tablet  -- 2 tab(s) by mouth once a day (at bedtime), As needed, Constipation  -- Indication: For Stool softener

## 2019-02-14 NOTE — DISCHARGE NOTE ADULT - PLAN OF CARE
Improved right ankle fracture healing after right ankle ORIF on 2/13/19 Non-weight bearing on right leg with walker  Elevate right leg with heel floating in the air  No strenuous activity, heavy lifting, driving, tub bathing, or returning to work until cleared by MD.  Keep right leg splint clean, dry and intact.  Sponge bathe only until your follow up appointment.  The splint cannot get wet in a shower  Follow up with Dr. Banerjee to schedule an appt within 10-14 days.  If you don't have a bowel movement by post op day 3, then take Milk of Magnesia (over the counter).  If no bowel movement by at least post op day 5, then use a Dulcolax suppository (over the counter) and/or a Fleets enema--if still no bowel movement, call your MD.  Contact your doctor if you experience: fever greater than 101.5, chills, chest pain, difficulty breathing, bleeding, redness or heat around the incision. Non-weight bearing on right leg with walker  Elevate right leg with heel floating in the air  No strenuous activity, heavy lifting, driving, tub bathing, or returning to work until cleared by MD.  Keep right leg splint clean, dry and intact.  Sponge bathe only until your follow up appointment.  The splint cannot get wet in a shower.  Follow up with Dr. Banerjee to schedule an appt within 10-14 days.  If you don't have a bowel movement by post op day 3, then take Milk of Magnesia (over the counter).  If no bowel movement by at least post op day 5, then use a Dulcolax suppository (over the counter) and/or a Fleets enema--if still no bowel movement, call your MD.  Contact your doctor if you experience: fever greater than 101.5, chills, chest pain, difficulty breathing, bleeding, redness or heat around the incision. Non-weight bearing on right leg with walker  Elevate right leg with heel floating in the air  No strenuous activity, heavy lifting, driving, tub bathing, or returning to work until cleared by MD.  Keep right leg splint clean, dry and intact.  Sponge bathe only until your follow up appointment.  The splint cannot get wet in a shower.  Follow up with Dr. Banerjee to schedule an appt within 10-14 days.  If you don't have a bowel movement by post op day 3, then take Milk of Magnesia (over the counter).  If no bowel movement by at least post op day 5, then use a Dulcolax suppository (over the counter) and/or a Fleets enema--if still no bowel movement, call your MD.  Contact your doctor if you experience: fever greater than 101.5, chills, chest pain, difficulty breathing, bleeding, redness or heat around the incision.  Continue Heparin for DVT prophylaxis for 2 weeks.

## 2019-02-14 NOTE — PROGRESS NOTE ADULT - SUBJECTIVE AND OBJECTIVE BOX
SUBJECTIVE: Patient seen and examined.  No issues overnight. Pain well controlled. Denies CP/SOB    OBJECTIVE:     Vital Signs Last 24 Hrs  T(C): 36.6 (2019 01:19), Max: 37 (2019 13:25)  T(F): 97.8 (2019 01:19), Max: 98.6 (2019 13:25)  HR: 64 (2019 01:19) (60 - 99)  BP: 103/66 (2019 01:19) (103/66 - 129/59)  BP(mean): 79 (2019 18:50) (78 - 85)  RR: 16 (:19) (12 - 18)  SpO2: 98% (:19) (98% - 100%)         RLE               Dressing: clean/dry/intact            Motor exam:  peripheral nerve block          Sensation:  peripheral nerve block          Vascular:  Warm/pink/well perfused             LABS:                        9.5    8.84  )-----------( 185      ( 2019 06:25 )             28.3     02-14    138  |  102  |  10  ----------------------------<  152<H>  4.0   |  24  |  0.65    Ca    9.4      2019 06:25      PT/INR - ( 2019 07:05 )   PT: 10.5 sec;   INR: 0.93          PTT - ( 2019 07:05 )  PTT:30.8 sec  Urinalysis Basic - ( 2019 12:02 )    Color: Yellow / Appearance: Clear / S.025 / pH: x  Gluc: x / Ketone: NEGATIVE  / Bili: Negative / Urobili: 0.2 E.U./dL   Blood: x / Protein: NEGATIVE mg/dL / Nitrite: NEGATIVE   Leuk Esterase: NEGATIVE / RBC: < 5 /HPF / WBC < 5 /HPF   Sq Epi: x / Non Sq Epi: 5-10 /HPF / Bacteria: Present /HPF        MEDICATIONS:  MEDICATIONS  (STANDING):  atorvastatin 20 milliGRAM(s) Oral at bedtime  docusate sodium 100 milliGRAM(s) Oral three times a day  melatonin 3 milliGRAM(s) Oral at bedtime  pantoprazole    Tablet 40 milliGRAM(s) Oral two times a day  polyethylene glycol 3350 17 Gram(s) Oral daily  sodium chloride 0.9%. 1000 milliLiter(s) (100 mL/Hr) IV Continuous <Continuous>    MEDICATIONS  (PRN):  acetaminophen   Tablet .. 650 milliGRAM(s) Oral every 6 hours PRN Mild Pain (1 - 3)  aluminum hydroxide/magnesium hydroxide/simethicone Suspension 30 milliLiter(s) Oral four times a day PRN Indigestion  benzocaine 15 mG/menthol 3.6 mG Lozenge 1 Lozenge Oral three times a day PRN Sore Throat  HYDROmorphone  Injectable 0.5 milliGRAM(s) IV Push every 4 hours PRN Severe Pain (7 - 10)  HYDROmorphone  Injectable 0.5 milliGRAM(s) IV Push every 10 minutes PRN Severe Pain (7 - 10)  magnesium hydroxide Suspension 30 milliLiter(s) Oral daily PRN Constipation  ondansetron Injectable 4 milliGRAM(s) IV Push every 6 hours PRN Nausea and/or Vomiting  oxyCODONE    IR 5 milliGRAM(s) Oral every 4 hours PRN Mild Pain (1 - 3)  oxyCODONE    IR 10 milliGRAM(s) Oral every 4 hours PRN Moderate Pain (4 - 6)  senna 2 Tablet(s) Oral at bedtime PRN Constipation      Anticoagulation:      Antibiotics:       Pain medications:   acetaminophen   Tablet .. 650 milliGRAM(s) Oral every 6 hours PRN  HYDROmorphone  Injectable 0.5 milliGRAM(s) IV Push every 4 hours PRN  HYDROmorphone  Injectable 0.5 milliGRAM(s) IV Push every 10 minutes PRN  melatonin 3 milliGRAM(s) Oral at bedtime  ondansetron Injectable 4 milliGRAM(s) IV Push every 6 hours PRN  oxyCODONE    IR 5 milliGRAM(s) Oral every 4 hours PRN  oxyCODONE    IR 10 milliGRAM(s) Oral every 4 hours PRN        A/P : 68f  s/p   R ankle ORIF  -    Pain control  -    DVT ppx:   -    ADAT  -    Check AM labs  -    Weight bearing status:  nwb  -    Resume home meds  -    Physical Therapy  -    Dispo: pending

## 2019-02-14 NOTE — PROGRESS NOTE ADULT - SUBJECTIVE AND OBJECTIVE BOX
Patient seen in RR last nite   Looked good then & also now  No SOB, chest pain  Unable to feel & move toes this AM, but could wiggle last night    T<100 pulse 66 B\P 120\70  Lungs Clear  Card Nl S1 & S2  Ext no edema    Hgb post transfusion 10.3 & 10.1  Yesterday AM BUN 12 Cr 0.63    Doing well. Not surprised that has no sensation or movement right foot, but surprised she had been able to move toes previously  No cardiorespiratory issues  Hgb should be repeated & would give another dose of iv iron prior to discharge    Dr Diogo Quijano  [phone]

## 2019-02-14 NOTE — DISCHARGE NOTE ADULT - HOSPITAL COURSE
Admit  OR- R ankle orif  Periop Antibx  DVT ppx  PT   Pain mgt  med c/s- syncope workup, gastritis, egd  anemia- IV iron infusion x2

## 2019-02-14 NOTE — DISCHARGE NOTE ADULT - CARE PROVIDER_API CALL
Del Banerjee)  Orthopaedic Surgery  [address], [address] [place] [address]  Phone: [phone]  Fax: [phone]  Follow Up Time:

## 2019-02-14 NOTE — DISCHARGE NOTE ADULT - CARE PLAN
Principal Discharge DX:	Fracture of ankle, closed, right, initial encounter  Goal:	Improved right ankle fracture healing after right ankle ORIF on 2/13/19  Assessment and plan of treatment:	Non-weight bearing on right leg with walker  Elevate right leg with heel floating in the air  No strenuous activity, heavy lifting, driving, tub bathing, or returning to work until cleared by MD.  Keep right leg splint clean, dry and intact.  Sponge bathe only until your follow up appointment.  The splint cannot get wet in a shower  Follow up with Dr. Banerjee to schedule an appt within 10-14 days.  If you don't have a bowel movement by post op day 3, then take Milk of Magnesia (over the counter).  If no bowel movement by at least post op day 5, then use a Dulcolax suppository (over the counter) and/or a Fleets enema--if still no bowel movement, call your MD.  Contact your doctor if you experience: fever greater than 101.5, chills, chest pain, difficulty breathing, bleeding, redness or heat around the incision. Principal Discharge DX:	Fracture of ankle, closed, right, initial encounter  Goal:	Improved right ankle fracture healing after right ankle ORIF on 2/13/19  Assessment and plan of treatment:	Non-weight bearing on right leg with walker  Elevate right leg with heel floating in the air  No strenuous activity, heavy lifting, driving, tub bathing, or returning to work until cleared by MD.  Keep right leg splint clean, dry and intact.  Sponge bathe only until your follow up appointment.  The splint cannot get wet in a shower.  Follow up with Dr. Banerjee to schedule an appt within 10-14 days.  If you don't have a bowel movement by post op day 3, then take Milk of Magnesia (over the counter).  If no bowel movement by at least post op day 5, then use a Dulcolax suppository (over the counter) and/or a Fleets enema--if still no bowel movement, call your MD.  Contact your doctor if you experience: fever greater than 101.5, chills, chest pain, difficulty breathing, bleeding, redness or heat around the incision. Principal Discharge DX:	Fracture of ankle, closed, right, initial encounter  Goal:	Improved right ankle fracture healing after right ankle ORIF on 2/13/19  Assessment and plan of treatment:	Non-weight bearing on right leg with walker  Elevate right leg with heel floating in the air  No strenuous activity, heavy lifting, driving, tub bathing, or returning to work until cleared by MD.  Keep right leg splint clean, dry and intact.  Sponge bathe only until your follow up appointment.  The splint cannot get wet in a shower.  Follow up with Dr. Banerjee to schedule an appt within 10-14 days.  If you don't have a bowel movement by post op day 3, then take Milk of Magnesia (over the counter).  If no bowel movement by at least post op day 5, then use a Dulcolax suppository (over the counter) and/or a Fleets enema--if still no bowel movement, call your MD.  Contact your doctor if you experience: fever greater than 101.5, chills, chest pain, difficulty breathing, bleeding, redness or heat around the incision.  Continue Heparin for DVT prophylaxis for 2 weeks.

## 2019-02-14 NOTE — PROGRESS NOTE ADULT - SUBJECTIVE AND OBJECTIVE BOX
Pt seen and examined   no complaints  post op day 1    REVIEW OF SYSTEMS:  Constitutional: No fever, weight loss or fatigue  Cardiovascular: No chest pain, palpitations, dizziness or leg swelling  Gastrointestinal: No abdominal or epigastric pain. No nausea, vomiting or hematemesis; No diarrhea or constipation. No melena or hematochezia.  Skin: No itching, burning, rashes or lesions       MEDICATIONS:  MEDICATIONS  (STANDING):  acetaminophen   Tablet .. 650 milliGRAM(s) Oral every 6 hours  atorvastatin 20 milliGRAM(s) Oral at bedtime  docusate sodium 100 milliGRAM(s) Oral three times a day  heparin  Injectable 5000 Unit(s) SubCutaneous every 8 hours  melatonin 3 milliGRAM(s) Oral at bedtime  pantoprazole    Tablet 40 milliGRAM(s) Oral two times a day  polyethylene glycol 3350 17 Gram(s) Oral daily    MEDICATIONS  (PRN):  acetaminophen   Tablet .. 650 milliGRAM(s) Oral every 6 hours PRN Mild Pain (1 - 3)  aluminum hydroxide/magnesium hydroxide/simethicone Suspension 30 milliLiter(s) Oral four times a day PRN Indigestion  benzocaine 15 mG/menthol 3.6 mG Lozenge 1 Lozenge Oral three times a day PRN Sore Throat  HYDROmorphone  Injectable 0.5 milliGRAM(s) IV Push every 4 hours PRN Severe Pain (7 - 10)  magnesium hydroxide Suspension 30 milliLiter(s) Oral daily PRN Constipation  ondansetron Injectable 4 milliGRAM(s) IV Push every 6 hours PRN Nausea and/or Vomiting  oxyCODONE    IR 5 milliGRAM(s) Oral every 4 hours PRN Mild Pain (1 - 3)  oxyCODONE    IR 10 milliGRAM(s) Oral every 4 hours PRN Moderate Pain (4 - 6)  senna 2 Tablet(s) Oral at bedtime PRN Constipation      Allergies    penicillin (Rash)    Intolerances        Vital Signs Last 24 Hrs  T(C): 36.3 (14 Feb 2019 09:12), Max: 37 (13 Feb 2019 13:25)  T(F): 97.3 (14 Feb 2019 09:12), Max: 98.6 (13 Feb 2019 13:25)  HR: 76 (14 Feb 2019 09:12) (60 - 99)  BP: 108/68 (14 Feb 2019 09:12) (103/66 - 129/59)  BP(mean): 79 (13 Feb 2019 18:50) (79 - 85)  RR: 15 (14 Feb 2019 09:12) (12 - 18)  SpO2: 98% (14 Feb 2019 09:12) (98% - 100%)    02-13 @ 07:01  -  02-14 @ 07:00  --------------------------------------------------------  IN: 120 mL / OUT: 1600 mL / NET: -1480 mL    02-14 @ 07:01 - 02-14 @ 12:31  --------------------------------------------------------  IN: 360 mL / OUT: 300 mL / NET: 60 mL        PHYSICAL EXAM:    General: Well developed; well nourished; in no acute distress  HEENT: MMM, conjunctiva and sclera clear  Gastrointestinal: Soft non-tender non-distended; Normal bowel sounds; No hepatosplenomegaly  Skin: Warm and dry. No obvious rash    LABS:      CBC Full  -  ( 14 Feb 2019 06:25 )  WBC Count : 8.84 K/uL  Hemoglobin : 9.5 g/dL  Hematocrit : 28.3 %  Platelet Count - Automated : 185 K/uL  Mean Cell Volume : 97.9 fl  Mean Cell Hemoglobin : 32.9 pg  Mean Cell Hemoglobin Concentration : 33.6 gm/dL  Auto Neutrophil # : x  Auto Lymphocyte # : x  Auto Monocyte # : x  Auto Eosinophil # : x  Auto Basophil # : x  Auto Neutrophil % : x  Auto Lymphocyte % : x  Auto Monocyte % : x  Auto Eosinophil % : x  Auto Basophil % : x    02-14    138  |  102  |  10  ----------------------------<  152<H>  4.0   |  24  |  0.65    Ca    9.4      14 Feb 2019 06:25      PT/INR - ( 13 Feb 2019 07:05 )   PT: 10.5 sec;   INR: 0.93          PTT - ( 13 Feb 2019 07:05 )  PTT:30.8 sec                  RADIOLOGY & ADDITIONAL STUDIES (The following images were personally reviewed):

## 2019-02-14 NOTE — PROGRESS NOTE ADULT - ASSESSMENT
Hgb 9.5   biopsy no helicobacter pylori  PPI for 2 months  can get rescope by Dr. Tate for ff up in 1 - 2 weeks to assess healing

## 2019-02-14 NOTE — DISCHARGE NOTE ADULT - PATIENT PORTAL LINK FT
You can access the [facility] Patient Portal, offered by [facility], by registering with the following website: [email or web address]

## 2019-02-14 NOTE — PROGRESS NOTE ADULT - SUBJECTIVE AND OBJECTIVE BOX
ORTHO NOTE    [x ] Pt seen/examined with  present  [x ] Pt without any complaints/in NAD.  Denies pain in right LE at this time    [ ] Pt complains of:      ROS: [ ] Fever  [ ] Chills  [ ] CP [ ] SOB [ ] Dysnea  [ ] Palpitations [ ] Cough [ ] N/V/C/D [ ] Paresthia [ ] Other     [ x] ROS  otherwise negative    .    PHYSICAL EXAM:    Vital Signs Last 24 Hrs  T(C): 36.3 (14 Feb 2019 09:12), Max: 37 (13 Feb 2019 13:25)  T(F): 97.3 (14 Feb 2019 09:12), Max: 98.6 (13 Feb 2019 13:25)  HR: 76 (14 Feb 2019 09:12) (60 - 99)  BP: 108/68 (14 Feb 2019 09:12) (103/66 - 129/59)  BP(mean): 79 (13 Feb 2019 18:50) (79 - 85)  RR: 15 (14 Feb 2019 09:12) (12 - 18)  SpO2: 98% (14 Feb 2019 09:12) (98% - 100%)    I&O's Detail    13 Feb 2019 07:01  -  14 Feb 2019 07:00  --------------------------------------------------------  IN:    Oral Fluid: 120 mL  Total IN: 120 mL    OUT:    Voided: 1600 mL  Total OUT: 1600 mL    Total NET: -1480 mL      14 Feb 2019 07:01  -  14 Feb 2019 10:21  --------------------------------------------------------  IN:    Oral Fluid: 360 mL  Total IN: 360 mL    OUT:    Voided: 300 mL  Total OUT: 300 mL    Total NET: 60 mL           CAPILLARY BLOOD GLUCOSE                      Neuro: AAOX3    Lungs: nonlabored, Spo2 wnl on RA, IS demonstrated    CV: BP stable    ABD: soft, nontender    Ext:  right LE splint cdi with right LE elevated and heel floating in the air  right LE motor exam:  unable to wiggle toes at this time secondary to peripheral nerve block   right LE sensation:  unable to feel sensation at this time secondary to peripheral nerve block   right LE vascular:  toes warm/pink/well perfused    LABS                        9.5    8.84  )-----------( 185      ( 14 Feb 2019 06:25 )             28.3                              PT/INR - ( 13 Feb 2019 07:05 )   PT: 10.5 sec;   INR: 0.93          PTT - ( 13 Feb 2019 07:05 )  PTT:30.8 sec  02-14    138  |  102  |  10  ----------------------------<  152<H>  4.0   |  24  |  0.65    Ca    9.4      14 Feb 2019 06:25        [ ] Other Labs  [ ] None ordered            Please check or [place] when present:  •  Heart Failure:    [ ] Acute        [ ]  Acute on Chronic        [ ] Chronic         [ ] Diastolic     [ ]  Combined    •  SULY:     [ ] ATN        [ ]  Renal medullary necrosis       [ ]  Renal cortical necrosis                  [ ] Other pathological Lesion:  •  CKD:  [ ] Stage I   [ ] Stage II  [ ] Stage III    [ ]Stage IV   [ ]  CKD V   [ ]  Other/Unspecified:    •  Abdominal Nutritional Status:   [ ] Malnutrition-See Nutrition note    [ ] Cachexia   [ ]  Other        [ ] Supplement ordered:            [ ] Morbid Obesity: BMI >=40         ASSESSMENT/PLAN:      STATUS POST: pod1 R ankle orif  pain control- regional block still working, acetaminophen 650mg q 6, prn oxycodone 5mg q 4  Patient encouraged to pump toes when motor returns and to notify RN when she feels pain  orthostatic vital signs  h/h 9.5/28.3.  Medicine attending Dr. Quijano recommends IV iron sucrose 200mg x1 dose today.  Trend cbc  bowel regimen  Dr. Leung recommends subqH for dvt ppx given GI bleed history.  Avoid NSAIDs  CONTINUE:          [ ] PT- nwb R LE    [ ] DVT PPX- scd L LE, subqH    [ ] Pain Mgt- po meds    [ ] Dispo plan- pending PT evaluation               Dressing: clean/dry/intact            Motor exam:  peripheral nerve block          Sensation:  peripheral nerve block          Vascular:  Warm/pink/well perfused ORTHO NOTE    [x ] Pt seen/examined with  present  [x ] Pt without any complaints/in NAD.  Denies pain in right LE at this time    [ ] Pt complains of:      ROS: [ ] Fever  [ ] Chills  [ ] CP [ ] SOB [ ] Dysnea  [ ] Palpitations [ ] Cough [ ] N/V/C/D [ ] Paresthia [ ] Other     [ x] ROS  otherwise negative    .    PHYSICAL EXAM:    Vital Signs Last 24 Hrs  T(C): 36.3 (14 Feb 2019 09:12), Max: 37 (13 Feb 2019 13:25)  T(F): 97.3 (14 Feb 2019 09:12), Max: 98.6 (13 Feb 2019 13:25)  HR: 76 (14 Feb 2019 09:12) (60 - 99)  BP: 108/68 (14 Feb 2019 09:12) (103/66 - 129/59)  BP(mean): 79 (13 Feb 2019 18:50) (79 - 85)  RR: 15 (14 Feb 2019 09:12) (12 - 18)  SpO2: 98% (14 Feb 2019 09:12) (98% - 100%)    I&O's Detail    13 Feb 2019 07:01  -  14 Feb 2019 07:00  --------------------------------------------------------  IN:    Oral Fluid: 120 mL  Total IN: 120 mL    OUT:    Voided: 1600 mL  Total OUT: 1600 mL    Total NET: -1480 mL      14 Feb 2019 07:01  -  14 Feb 2019 10:21  --------------------------------------------------------  IN:    Oral Fluid: 360 mL  Total IN: 360 mL    OUT:    Voided: 300 mL  Total OUT: 300 mL    Total NET: 60 mL           CAPILLARY BLOOD GLUCOSE                      Neuro: AAOX3    Lungs: nonlabored, Spo2 wnl on RA, IS demonstrated    CV: BP stable    ABD: soft, nontender    Ext:  right LE splint cdi with right LE elevated and heel floating in the air  right LE motor exam:  unable to wiggle toes at this time secondary to peripheral nerve block   right LE sensation:  unable to feel sensation at this time secondary to peripheral nerve block   right LE vascular:  toes warm/pink/well perfused    LABS                        9.5    8.84  )-----------( 185      ( 14 Feb 2019 06:25 )             28.3                              PT/INR - ( 13 Feb 2019 07:05 )   PT: 10.5 sec;   INR: 0.93          PTT - ( 13 Feb 2019 07:05 )  PTT:30.8 sec  02-14    138  |  102  |  10  ----------------------------<  152<H>  4.0   |  24  |  0.65    Ca    9.4      14 Feb 2019 06:25        [ ] Other Labs  [ ] None ordered            Please check or [place] when present:  •  Heart Failure:    [ ] Acute        [ ]  Acute on Chronic        [ ] Chronic         [ ] Diastolic     [ ]  Combined    •  SULY:     [ ] ATN        [ ]  Renal medullary necrosis       [ ]  Renal cortical necrosis                  [ ] Other pathological Lesion:  •  CKD:  [ ] Stage I   [ ] Stage II  [ ] Stage III    [ ]Stage IV   [ ]  CKD V   [ ]  Other/Unspecified:    •  Abdominal Nutritional Status:   [ ] Malnutrition-See Nutrition note    [ ] Cachexia   [ ]  Other        [ ] Supplement ordered:            [ ] Morbid Obesity: BMI >=40         ASSESSMENT/PLAN:      STATUS POST: pod1 R ankle orif  pain control- regional block still working, acetaminophen 650mg q 6, prn oxycodone 5mg q 4  Patient encouraged to pump toes when motor returns and to notify RN when she feels pain  orthostatic vital signs  h/h 9.5/28.3.  Medicine attending Dr. Quijano recommends IV iron sucrose 200mg x1 dose today.  Trend cbc  bowel regimen  gastritits- continue po PPI.  Dr. Leung recommends subqH for dvt ppx given GI bleed history.  Avoid NSAIDs  CONTINUE:          [ ] PT- nwb R LE    [ ] DVT PPX- scd L LE, subqH    [ ] Pain Mgt- po meds    [ ] Dispo plan- pending PT evaluation

## 2019-02-15 LAB
ANION GAP SERPL CALC-SCNC: 9 MMOL/L — SIGNIFICANT CHANGE UP (ref 5–17)
BUN SERPL-MCNC: 11 MG/DL — SIGNIFICANT CHANGE UP (ref 7–23)
CALCIUM SERPL-MCNC: 9.4 MG/DL — SIGNIFICANT CHANGE UP (ref 8.4–10.5)
CHLORIDE SERPL-SCNC: 107 MMOL/L — SIGNIFICANT CHANGE UP (ref 96–108)
CO2 SERPL-SCNC: 24 MMOL/L — SIGNIFICANT CHANGE UP (ref 22–31)
CREAT SERPL-MCNC: 0.66 MG/DL — SIGNIFICANT CHANGE UP (ref 0.5–1.3)
GLUCOSE SERPL-MCNC: 98 MG/DL — SIGNIFICANT CHANGE UP (ref 70–99)
HCT VFR BLD CALC: 27.7 % — LOW (ref 34.5–45)
HGB BLD-MCNC: 9.2 G/DL — LOW (ref 11.5–15.5)
MCHC RBC-ENTMCNC: 33.2 GM/DL — SIGNIFICANT CHANGE UP (ref 32–36)
MCHC RBC-ENTMCNC: 33.3 PG — SIGNIFICANT CHANGE UP (ref 27–34)
MCV RBC AUTO: 100.4 FL — HIGH (ref 80–100)
NRBC # BLD: 0 /100 WBCS — SIGNIFICANT CHANGE UP (ref 0–0)
PLATELET # BLD AUTO: 195 K/UL — SIGNIFICANT CHANGE UP (ref 150–400)
POTASSIUM SERPL-MCNC: 4.1 MMOL/L — SIGNIFICANT CHANGE UP (ref 3.5–5.3)
POTASSIUM SERPL-SCNC: 4.1 MMOL/L — SIGNIFICANT CHANGE UP (ref 3.5–5.3)
RBC # BLD: 2.76 M/UL — LOW (ref 3.8–5.2)
RBC # FLD: 16.8 % — HIGH (ref 10.3–14.5)
SODIUM SERPL-SCNC: 140 MMOL/L — SIGNIFICANT CHANGE UP (ref 135–145)
WBC # BLD: 7.07 K/UL — SIGNIFICANT CHANGE UP (ref 3.8–10.5)
WBC # FLD AUTO: 7.07 K/UL — SIGNIFICANT CHANGE UP (ref 3.8–10.5)

## 2019-02-15 RX ORDER — ACETAMINOPHEN 500 MG
2 TABLET ORAL
Qty: 0 | Refills: 0 | COMMUNITY
Start: 2019-02-15

## 2019-02-15 RX ORDER — IRON SUCROSE 20 MG/ML
200 INJECTION, SOLUTION INTRAVENOUS ONCE
Qty: 0 | Refills: 0 | Status: COMPLETED | OUTPATIENT
Start: 2019-02-15 | End: 2019-02-15

## 2019-02-15 RX ORDER — OXYCODONE HYDROCHLORIDE 5 MG/1
1 TABLET ORAL
Qty: 0 | Refills: 0 | COMMUNITY
Start: 2019-02-15

## 2019-02-15 RX ORDER — HEPARIN SODIUM 5000 [USP'U]/ML
5000 INJECTION INTRAVENOUS; SUBCUTANEOUS
Qty: 0 | Refills: 0 | COMMUNITY
Start: 2019-02-15

## 2019-02-15 RX ADMIN — HYDROMORPHONE HYDROCHLORIDE 0.5 MILLIGRAM(S): 2 INJECTION INTRAMUSCULAR; INTRAVENOUS; SUBCUTANEOUS at 00:59

## 2019-02-15 RX ADMIN — Medication 3 MILLIGRAM(S): at 21:17

## 2019-02-15 RX ADMIN — Medication 650 MILLIGRAM(S): at 00:11

## 2019-02-15 RX ADMIN — OXYCODONE HYDROCHLORIDE 10 MILLIGRAM(S): 5 TABLET ORAL at 09:20

## 2019-02-15 RX ADMIN — Medication 650 MILLIGRAM(S): at 12:15

## 2019-02-15 RX ADMIN — Medication 100 MILLIGRAM(S): at 21:17

## 2019-02-15 RX ADMIN — ATORVASTATIN CALCIUM 20 MILLIGRAM(S): 80 TABLET, FILM COATED ORAL at 21:17

## 2019-02-15 RX ADMIN — HEPARIN SODIUM 5000 UNIT(S): 5000 INJECTION INTRAVENOUS; SUBCUTANEOUS at 06:18

## 2019-02-15 RX ADMIN — OXYCODONE HYDROCHLORIDE 10 MILLIGRAM(S): 5 TABLET ORAL at 12:55

## 2019-02-15 RX ADMIN — OXYCODONE HYDROCHLORIDE 10 MILLIGRAM(S): 5 TABLET ORAL at 13:25

## 2019-02-15 RX ADMIN — OXYCODONE HYDROCHLORIDE 10 MILLIGRAM(S): 5 TABLET ORAL at 08:57

## 2019-02-15 RX ADMIN — Medication 650 MILLIGRAM(S): at 22:20

## 2019-02-15 RX ADMIN — HEPARIN SODIUM 5000 UNIT(S): 5000 INJECTION INTRAVENOUS; SUBCUTANEOUS at 14:15

## 2019-02-15 RX ADMIN — Medication 650 MILLIGRAM(S): at 11:54

## 2019-02-15 RX ADMIN — OXYCODONE HYDROCHLORIDE 10 MILLIGRAM(S): 5 TABLET ORAL at 22:20

## 2019-02-15 RX ADMIN — Medication 650 MILLIGRAM(S): at 23:20

## 2019-02-15 RX ADMIN — OXYCODONE HYDROCHLORIDE 10 MILLIGRAM(S): 5 TABLET ORAL at 16:59

## 2019-02-15 RX ADMIN — HEPARIN SODIUM 5000 UNIT(S): 5000 INJECTION INTRAVENOUS; SUBCUTANEOUS at 21:17

## 2019-02-15 RX ADMIN — Medication 100 MILLIGRAM(S): at 06:18

## 2019-02-15 RX ADMIN — OXYCODONE HYDROCHLORIDE 10 MILLIGRAM(S): 5 TABLET ORAL at 03:50

## 2019-02-15 RX ADMIN — OXYCODONE HYDROCHLORIDE 10 MILLIGRAM(S): 5 TABLET ORAL at 16:39

## 2019-02-15 RX ADMIN — Medication 650 MILLIGRAM(S): at 07:19

## 2019-02-15 RX ADMIN — Medication 100 MILLIGRAM(S): at 14:15

## 2019-02-15 RX ADMIN — Medication 650 MILLIGRAM(S): at 06:19

## 2019-02-15 RX ADMIN — OXYCODONE HYDROCHLORIDE 10 MILLIGRAM(S): 5 TABLET ORAL at 02:50

## 2019-02-15 RX ADMIN — OXYCODONE HYDROCHLORIDE 10 MILLIGRAM(S): 5 TABLET ORAL at 23:20

## 2019-02-15 RX ADMIN — PANTOPRAZOLE SODIUM 40 MILLIGRAM(S): 20 TABLET, DELAYED RELEASE ORAL at 06:18

## 2019-02-15 RX ADMIN — HYDROMORPHONE HYDROCHLORIDE 0.5 MILLIGRAM(S): 2 INJECTION INTRAMUSCULAR; INTRAVENOUS; SUBCUTANEOUS at 00:44

## 2019-02-15 RX ADMIN — IRON SUCROSE 110 MILLIGRAM(S): 20 INJECTION, SOLUTION INTRAVENOUS at 16:34

## 2019-02-15 NOTE — DIETITIAN INITIAL EVALUATION ADULT. - NS AS NUTRI INTERV ED CONTENT
Pt was educated on increased needs post-op. Discussed optimal nutrient dense foods high in protein. Pt was receptive and expressed understanding./Purpose of the nutrition education

## 2019-02-15 NOTE — DIETITIAN INITIAL EVALUATION ADULT. - ENERGY NEEDS
Height: 5'4" Weight: 145lbs, IBW 120lbs+/-10%, %%, BMI 24.9  IBW used for calculations as pt >120% of IBW   Nutrient needs based on [facility] standards of care for maintenance in older adults.   Needs adjusted for age and post-op healing

## 2019-02-15 NOTE — DIETITIAN INITIAL EVALUATION ADULT. - PROBLEM SELECTOR PLAN 3
Pt with dark stools and anemia requiring 1U PRBCs.  -protonix 40 Q12  -plan for EGD with Dr. Carvalho tomorrow  -NPO @ [facility] for EGD

## 2019-02-15 NOTE — PROGRESS NOTE ADULT - SUBJECTIVE AND OBJECTIVE BOX
Patient pain began last night after block wore off, but able to sleep   No SOB, chest pain  No BM    T<100 Pulse 66 B\P 96\60  Lungs clear  Card Nl S1 & S2  Ext no edema      Hgb 9.2 WBC 7070 Plt 195K  BUN 11 Cr 0.66    Hgb slowly falling  Do not think patient is actively bleeding but would not discharge until Hgb stabilizes  Would also give another dose of iv iron    Dr Diogo Quijano  [phone]

## 2019-02-15 NOTE — DIETITIAN INITIAL EVALUATION ADULT. - PROBLEM SELECTOR PLAN 1
Pt presenting after 2 syncopal episodes in setting of diarrhea with dark/black stools after significant NSAID use s/p dental procedure.  -s/p 1U PRBCs with Hgb 8.2

## 2019-02-15 NOTE — DIETITIAN INITIAL EVALUATION ADULT. - OTHER INFO
67yo F s/p R ankle ORIF POD2. D/c pending stabelization of Hgb. Pt seen resting in bed. Currently on a regular diet and tolerating PO. Endorsing good appetite, consuming ~75% of foods/ Denies N/V, experiencing constipation- on bowel regimen. Encouraged high fiber/fluids. Discussed increased needs post-op and nutrient dense foods to optimize. Pt inquiring about iron-rich foods aswell- provided written edu on iron nutrition therapy. NKFA or dietary restrictions. Wt stable. Skin: surgical incision; GI WDL per flowsheet.

## 2019-02-15 NOTE — PROGRESS NOTE ADULT - SUBJECTIVE AND OBJECTIVE BOX
Ortho Note    Pt comfortable complaining of mild incisional soreness.  Denies CP, SOB, N/V, numbness/tingling down lower extremities b/l    Vital Signs Last 24 Hrs  AVSS    General: Pt Alert and oriented, NAD  DSG ace wrap C/D/I with posterior right ankle splint in place  Pulses: brisk cap refill lower extremities b/l   Sensation: gross sensation to light touch intact in lower extremities b/l  Motor: firing all toes b/l, flexing and extending knee b/l                          9.2    7.07  )-----------( 195      ( 15 Feb 2019 06:50 )             27.7   15 Feb 2019 06:50    140    |  107    |  11     ----------------------------<  98     4.1     |  24     |  0.66           A/P: 68yFemale POD#2 s/p right ankle ORIF  - Stable  - Pain Control as needed  - DVT ppx: sbq heparin  - PT, WBS: nwb RLE wbat LLE wbat while using walker  - trend labs  - IV Iron as per Dr. Quijano pcp. h/h 9.2/27.7  - cleared by PT  - dispo CHARLENE for tomorrow    Ortho Pager [phone]

## 2019-02-16 VITALS
DIASTOLIC BLOOD PRESSURE: 70 MMHG | HEART RATE: 75 BPM | TEMPERATURE: 97 F | SYSTOLIC BLOOD PRESSURE: 116 MMHG | RESPIRATION RATE: 15 BRPM | OXYGEN SATURATION: 98 %

## 2019-02-16 LAB
ANION GAP SERPL CALC-SCNC: 12 MMOL/L — SIGNIFICANT CHANGE UP (ref 5–17)
BUN SERPL-MCNC: 12 MG/DL — SIGNIFICANT CHANGE UP (ref 7–23)
CALCIUM SERPL-MCNC: 10 MG/DL — SIGNIFICANT CHANGE UP (ref 8.4–10.5)
CHLORIDE SERPL-SCNC: 103 MMOL/L — SIGNIFICANT CHANGE UP (ref 96–108)
CO2 SERPL-SCNC: 26 MMOL/L — SIGNIFICANT CHANGE UP (ref 22–31)
CREAT SERPL-MCNC: 0.77 MG/DL — SIGNIFICANT CHANGE UP (ref 0.5–1.3)
GLUCOSE SERPL-MCNC: 106 MG/DL — HIGH (ref 70–99)
HCT VFR BLD CALC: 30.4 % — LOW (ref 34.5–45)
HGB BLD-MCNC: 10 G/DL — LOW (ref 11.5–15.5)
MCHC RBC-ENTMCNC: 32.8 PG — SIGNIFICANT CHANGE UP (ref 27–34)
MCHC RBC-ENTMCNC: 32.9 GM/DL — SIGNIFICANT CHANGE UP (ref 32–36)
MCV RBC AUTO: 99.7 FL — SIGNIFICANT CHANGE UP (ref 80–100)
NRBC # BLD: 0 /100 WBCS — SIGNIFICANT CHANGE UP (ref 0–0)
PLATELET # BLD AUTO: 247 K/UL — SIGNIFICANT CHANGE UP (ref 150–400)
POTASSIUM SERPL-MCNC: 4.2 MMOL/L — SIGNIFICANT CHANGE UP (ref 3.5–5.3)
POTASSIUM SERPL-SCNC: 4.2 MMOL/L — SIGNIFICANT CHANGE UP (ref 3.5–5.3)
RBC # BLD: 3.05 M/UL — LOW (ref 3.8–5.2)
RBC # FLD: 16 % — HIGH (ref 10.3–14.5)
SODIUM SERPL-SCNC: 141 MMOL/L — SIGNIFICANT CHANGE UP (ref 135–145)
WBC # BLD: 5.47 K/UL — SIGNIFICANT CHANGE UP (ref 3.8–10.5)
WBC # FLD AUTO: 5.47 K/UL — SIGNIFICANT CHANGE UP (ref 3.8–10.5)

## 2019-02-16 RX ADMIN — OXYCODONE HYDROCHLORIDE 10 MILLIGRAM(S): 5 TABLET ORAL at 09:54

## 2019-02-16 RX ADMIN — Medication 650 MILLIGRAM(S): at 07:06

## 2019-02-16 RX ADMIN — OXYCODONE HYDROCHLORIDE 10 MILLIGRAM(S): 5 TABLET ORAL at 13:30

## 2019-02-16 RX ADMIN — HEPARIN SODIUM 5000 UNIT(S): 5000 INJECTION INTRAVENOUS; SUBCUTANEOUS at 06:05

## 2019-02-16 RX ADMIN — Medication 100 MILLIGRAM(S): at 06:06

## 2019-02-16 RX ADMIN — OXYCODONE HYDROCHLORIDE 10 MILLIGRAM(S): 5 TABLET ORAL at 09:25

## 2019-02-16 RX ADMIN — PANTOPRAZOLE SODIUM 40 MILLIGRAM(S): 20 TABLET, DELAYED RELEASE ORAL at 06:06

## 2019-02-16 RX ADMIN — Medication 650 MILLIGRAM(S): at 06:06

## 2019-02-16 NOTE — PROGRESS NOTE ADULT - SUBJECTIVE AND OBJECTIVE BOX
Ortho Note    Pt comfortable complaining of mild incisional soreness.  Denies CP, SOB, N/V, numbness/tingling down lower extremities b/l    AVSS    General: Pt Alert and oriented, NAD  DSG ace wrap C/D/I with posterior right ankle splint in place  Pulses: brisk cap refill lower extremities b/l   Sensation: gross sensation to light touch intact in lower extremities b/l  Motor: firing all toes b/l, flexing and extending knee b/l                          9.2    7.07  )-----------( 195      ( 15 Feb 2019 06:50 )             27.7   15 Feb 2019 06:50    140    |  107    |  11     ----------------------------<  98     4.1     |  24     |  0.66           A/P: 68yFemale POD#3 s/p right ankle ORIF  - Stable  - Pain Control as needed  - DVT ppx: sbq heparin  - PT, WBS: nwb RLE wbat LLE wbat while using walker  - trend labs  - IV Iron as per Dr. Quijano pcp. h/h 9.2/27.7  - cleared by PT  - dispo CHARLENE steve    Ortho Pager [phone]

## 2019-02-16 NOTE — PROGRESS NOTE ADULT - REASON FOR ADMISSION
Syncope
right ankle pain
Syncope

## 2019-02-16 NOTE — PROGRESS NOTE ADULT - PROVIDER SPECIALTY LIST ADULT
Gastroenterology
Internal Medicine
Orthopedics
Internal Medicine
Internal Medicine

## 2019-02-19 PROBLEM — F41.9 ANXIETY DISORDER, UNSPECIFIED: Chronic | Status: ACTIVE | Noted: 2019-02-10

## 2019-02-19 PROBLEM — F32.9 MAJOR DEPRESSIVE DISORDER, SINGLE EPISODE, UNSPECIFIED: Chronic | Status: ACTIVE | Noted: 2019-02-10

## 2019-02-19 PROBLEM — E78.5 HYPERLIPIDEMIA, UNSPECIFIED: Chronic | Status: ACTIVE | Noted: 2019-02-10

## 2019-02-20 ENCOUNTER — APPOINTMENT (OUTPATIENT)
Age: 69
End: 2019-02-20

## 2019-02-20 DIAGNOSIS — K29.00 ACUTE GASTRITIS W/OUT BLEEDING: ICD-10-CM

## 2019-02-20 DIAGNOSIS — Z87.81 PRESENCE OF OTHER BONE AND TENDON IMPLANTS: ICD-10-CM

## 2019-02-20 DIAGNOSIS — Z96.7 PRESENCE OF OTHER BONE AND TENDON IMPLANTS: ICD-10-CM

## 2019-02-20 DIAGNOSIS — R55 SYNCOPE AND COLLAPSE: ICD-10-CM

## 2019-02-20 PROBLEM — Z00.00 ENCOUNTER FOR PREVENTIVE HEALTH EXAMINATION: Status: ACTIVE | Noted: 2019-02-20

## 2019-02-22 PROBLEM — R55 SYNCOPE AND COLLAPSE: Status: ACTIVE | Noted: 2019-02-22

## 2019-02-22 PROBLEM — Z96.7 STATUS POST ORIF OF FRACTURE OF ANKLE: Status: ACTIVE | Noted: 2019-02-22

## 2019-02-22 PROBLEM — K29.00 ACUTE GASTRITIS, PRESENCE OF BLEEDING UNSPECIFIED, UNSPECIFIED GASTRITIS TYPE: Status: ACTIVE | Noted: 2019-02-22

## 2019-02-22 RX ORDER — ACETAMINOPHEN 325 MG/1
325 TABLET, FILM COATED ORAL
Refills: 0 | Status: ACTIVE | COMMUNITY
Start: 2019-02-22

## 2019-02-22 RX ORDER — ATORVASTATIN CALCIUM 20 MG/1
20 TABLET, FILM COATED ORAL
Refills: 0 | Status: ACTIVE | COMMUNITY
Start: 2019-02-22

## 2019-02-22 RX ORDER — OXYCODONE 10 MG/1
10 TABLET ORAL
Refills: 0 | Status: ACTIVE | COMMUNITY
Start: 2019-02-22

## 2019-02-22 RX ORDER — SENNOSIDES 8.6 MG TABLETS 8.6 MG/1
8.6 TABLET ORAL
Refills: 0 | Status: ACTIVE | COMMUNITY
Start: 2019-02-22

## 2019-02-22 RX ORDER — SERTRALINE HYDROCHLORIDE 50 MG/1
50 TABLET, FILM COATED ORAL DAILY
Refills: 0 | Status: ACTIVE | COMMUNITY
Start: 2019-02-22

## 2019-02-22 RX ORDER — OXYCODONE 5 MG/1
5 TABLET ORAL
Refills: 0 | Status: ACTIVE | COMMUNITY
Start: 2019-02-22

## 2019-02-25 DIAGNOSIS — S82.891A OTHER FRACTURE OF RIGHT LOWER LEG, INITIAL ENCOUNTER FOR CLOSED FRACTURE: ICD-10-CM

## 2019-02-25 DIAGNOSIS — K25.4 CHRONIC OR UNSPECIFIED GASTRIC ULCER WITH HEMORRHAGE: ICD-10-CM

## 2019-02-25 DIAGNOSIS — D62 ACUTE POSTHEMORRHAGIC ANEMIA: ICD-10-CM

## 2019-02-25 DIAGNOSIS — E86.0 DEHYDRATION: ICD-10-CM

## 2019-02-25 DIAGNOSIS — R55 SYNCOPE AND COLLAPSE: ICD-10-CM

## 2019-02-25 DIAGNOSIS — T39.395A ADVERSE EFFECT OF OTHER NONSTEROIDAL ANTI-INFLAMMATORY DRUGS [NSAID], INITIAL ENCOUNTER: ICD-10-CM

## 2019-02-25 DIAGNOSIS — Z88.0 ALLERGY STATUS TO PENICILLIN: ICD-10-CM

## 2019-02-25 DIAGNOSIS — K29.01 ACUTE GASTRITIS WITH BLEEDING: ICD-10-CM

## 2019-02-25 DIAGNOSIS — Z96.649 PRESENCE OF UNSPECIFIED ARTIFICIAL HIP JOINT: ICD-10-CM

## 2019-02-25 DIAGNOSIS — I10 ESSENTIAL (PRIMARY) HYPERTENSION: ICD-10-CM
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
## 2019-02-25 DIAGNOSIS — E78.5 HYPERLIPIDEMIA, UNSPECIFIED: ICD-10-CM

## 2019-02-25 DIAGNOSIS — F41.8 OTHER SPECIFIED ANXIETY DISORDERS: ICD-10-CM

## 2019-02-25 DIAGNOSIS — M19.90 UNSPECIFIED OSTEOARTHRITIS, UNSPECIFIED SITE: ICD-10-CM

## 2019-02-25 DIAGNOSIS — F17.200 NICOTINE DEPENDENCE, UNSPECIFIED, UNCOMPLICATED: ICD-10-CM
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
## 2019-02-25 DIAGNOSIS — K44.9 DIAPHRAGMATIC HERNIA WITHOUT OBSTRUCTION OR GANGRENE: ICD-10-CM

## 2019-02-26 PROCEDURE — 82728 ASSAY OF FERRITIN: CPT

## 2019-02-26 PROCEDURE — C1713: CPT

## 2019-02-26 PROCEDURE — 82746 ASSAY OF FOLIC ACID SERUM: CPT

## 2019-02-26 PROCEDURE — 73560 X-RAY EXAM OF KNEE 1 OR 2: CPT

## 2019-02-26 PROCEDURE — 86901 BLOOD TYPING SEROLOGIC RH(D): CPT

## 2019-02-26 PROCEDURE — 86850 RBC ANTIBODY SCREEN: CPT

## 2019-02-26 PROCEDURE — 86923 COMPATIBILITY TEST ELECTRIC: CPT

## 2019-02-26 PROCEDURE — 73610 X-RAY EXAM OF ANKLE: CPT

## 2019-02-26 PROCEDURE — C1769: CPT

## 2019-02-26 PROCEDURE — 73700 CT LOWER EXTREMITY W/O DYE: CPT

## 2019-02-26 PROCEDURE — 80053 COMPREHEN METABOLIC PANEL: CPT

## 2019-02-26 PROCEDURE — 97161 PT EVAL LOW COMPLEX 20 MIN: CPT

## 2019-02-26 PROCEDURE — 80048 BASIC METABOLIC PNL TOTAL CA: CPT

## 2019-02-26 PROCEDURE — 96361 HYDRATE IV INFUSION ADD-ON: CPT

## 2019-02-26 PROCEDURE — 71045 X-RAY EXAM CHEST 1 VIEW: CPT

## 2019-02-26 PROCEDURE — 85025 COMPLETE CBC W/AUTO DIFF WBC: CPT

## 2019-02-26 PROCEDURE — 83690 ASSAY OF LIPASE: CPT

## 2019-02-26 PROCEDURE — 96375 TX/PRO/DX INJ NEW DRUG ADDON: CPT

## 2019-02-26 PROCEDURE — 84100 ASSAY OF PHOSPHORUS: CPT

## 2019-02-26 PROCEDURE — 83735 ASSAY OF MAGNESIUM: CPT

## 2019-02-26 PROCEDURE — 83036 HEMOGLOBIN GLYCOSYLATED A1C: CPT

## 2019-02-26 PROCEDURE — 82962 GLUCOSE BLOOD TEST: CPT

## 2019-02-26 PROCEDURE — 81001 URINALYSIS AUTO W/SCOPE: CPT

## 2019-02-26 PROCEDURE — 88305 TISSUE EXAM BY PATHOLOGIST: CPT

## 2019-02-26 PROCEDURE — 96374 THER/PROPH/DIAG INJ IV PUSH: CPT

## 2019-02-26 PROCEDURE — 73600 X-RAY EXAM OF ANKLE: CPT

## 2019-02-26 PROCEDURE — 84484 ASSAY OF TROPONIN QUANT: CPT

## 2019-02-26 PROCEDURE — 82553 CREATINE MB FRACTION: CPT

## 2019-02-26 PROCEDURE — 85730 THROMBOPLASTIN TIME PARTIAL: CPT

## 2019-02-26 PROCEDURE — 97116 GAIT TRAINING THERAPY: CPT

## 2019-02-26 PROCEDURE — 86900 BLOOD TYPING SEROLOGIC ABO: CPT

## 2019-02-26 PROCEDURE — 85027 COMPLETE CBC AUTOMATED: CPT

## 2019-02-26 PROCEDURE — 93005 ELECTROCARDIOGRAM TRACING: CPT

## 2019-02-26 PROCEDURE — 83550 IRON BINDING TEST: CPT

## 2019-02-26 PROCEDURE — 82550 ASSAY OF CK (CPK): CPT

## 2019-02-26 PROCEDURE — 83540 ASSAY OF IRON: CPT

## 2019-02-26 PROCEDURE — P9016: CPT

## 2019-02-26 PROCEDURE — 36430 TRANSFUSION BLD/BLD COMPNT: CPT

## 2019-02-26 PROCEDURE — 36415 COLL VENOUS BLD VENIPUNCTURE: CPT

## 2019-02-26 PROCEDURE — 82607 VITAMIN B-12: CPT

## 2019-02-26 PROCEDURE — 76000 FLUOROSCOPY <1 HR PHYS/QHP: CPT

## 2019-02-26 PROCEDURE — 99285 EMERGENCY DEPT VISIT HI MDM: CPT | Mod: 25

## 2019-02-26 PROCEDURE — 84466 ASSAY OF TRANSFERRIN: CPT

## 2019-02-26 PROCEDURE — 85610 PROTHROMBIN TIME: CPT

## 2020-02-18 ENCOUNTER — OUTPATIENT (OUTPATIENT)
Dept: OUTPATIENT SERVICES | Facility: HOSPITAL | Age: 70
LOS: 1 days | End: 2020-02-18

## 2020-02-18 ENCOUNTER — APPOINTMENT (OUTPATIENT)
Dept: OPHTHALMOLOGY | Facility: CLINIC | Age: 70
End: 2020-02-18

## 2020-02-18 DIAGNOSIS — Z96.649 PRESENCE OF UNSPECIFIED ARTIFICIAL HIP JOINT: Chronic | ICD-10-CM

## 2020-02-19 DIAGNOSIS — H40.009 PREGLAUCOMA, UNSPECIFIED, UNSPECIFIED EYE: ICD-10-CM

## 2021-06-09 NOTE — DIETITIAN INITIAL EVALUATION ADULT. - PROBLEM SELECTOR PLAN 7
Genie Calderón - DEVIN
1) PCP Contacted on Admission: (Y/N) --> Yes Name & Phone #: Dr. Quijano, following in house  2) Date of Contact with PCP:  3) PCP Contacted at Discharge: (Y/N)  4) Summary of Handoff Given to PCP:   5) Post-Discharge Appointment Date and Location:

## 2022-02-24 NOTE — ED PROVIDER NOTE - RATE
[General Appearance - Alert] : alert [General Appearance - In No Acute Distress] : in no acute distress [General Appearance - Well Nourished] : well nourished [Oriented To Time, Place, And Person] : oriented to person, place, and time [Impaired Insight] : insight and judgment were intact 75